# Patient Record
Sex: MALE | Race: WHITE | NOT HISPANIC OR LATINO | Employment: FULL TIME | URBAN - METROPOLITAN AREA
[De-identification: names, ages, dates, MRNs, and addresses within clinical notes are randomized per-mention and may not be internally consistent; named-entity substitution may affect disease eponyms.]

---

## 2018-05-25 LAB
ALBUMIN SERPL BCP-MCNC: 4.3 G/DL (ref 3.5–5.7)
ALP SERPL-CCNC: 71 IU/L (ref 40–150)
ALT SERPL W P-5'-P-CCNC: 41 IU/L (ref 0–50)
ANION GAP SERPL CALCULATED.3IONS-SCNC: 11.5 MM/L
AST SERPL W P-5'-P-CCNC: 25 U/L (ref 8–27)
BASOPHILS # BLD AUTO: 0 X3/UL (ref 0–0.3)
BASOPHILS # BLD AUTO: 0.6 % (ref 0–2)
BILIRUB SERPL-MCNC: 0.5 MG/DL (ref 0.3–1)
BUN SERPL-MCNC: 17 MG/DL (ref 7–25)
CALCIUM SERPL-MCNC: 9.6 MG/DL (ref 8.6–10.5)
CHLORIDE SERPL-SCNC: 102 MM/L (ref 98–107)
CHOLEST SERPL-MCNC: 184 MG/DL (ref 0–200)
CO2 SERPL-SCNC: 30 MM/L (ref 21–31)
CREAT SERPL-MCNC: 1.23 MG/DL (ref 0.7–1.3)
DEPRECATED RDW RBC AUTO: 13.3 %
EGFR (HISTORICAL): > 60 GFR
EGFR AFRICAN AMERICAN (HISTORICAL): > 60 GFR
EOSINOPHIL # BLD AUTO: 0.1 X3/UL (ref 0–0.5)
EOSINOPHIL NFR BLD AUTO: 1.5 % (ref 0–5)
GLUCOSE (HISTORICAL): 114 MG/DL (ref 65–99)
HCT VFR BLD AUTO: 46.4 % (ref 42–52)
HDLC SERPL-MCNC: 31 MG/DL (ref 40–60)
HGB BLD-MCNC: 16.2 G/DL (ref 14–18)
LDLC SERPL CALC-MCNC: 133 MG/DL (ref 75–193)
LYMPHOCYTES # BLD AUTO: 1.5 X3/UL (ref 1.2–4.2)
LYMPHOCYTES NFR BLD AUTO: 19.1 % (ref 20.5–51.1)
MCH RBC QN AUTO: 31.3 PG (ref 26–34)
MCHC RBC AUTO-ENTMCNC: 35 G/DL (ref 31–37)
MCV RBC AUTO: 89.5 FL (ref 81–99)
MONOCYTES # BLD AUTO: 0.6 X3/UL (ref 0–1)
MONOCYTES NFR BLD AUTO: 7.2 % (ref 1.7–12)
NEUTROPHILS # BLD AUTO: 5.6 X3/UL (ref 1.4–6.5)
NEUTS SEG NFR BLD AUTO: 71.6 % (ref 42.2–75.2)
OSMOLALITY, SERUM (HISTORICAL): 280 MOSM (ref 262–291)
PLATELET # BLD AUTO: 177 X3/UL (ref 130–400)
PMV BLD AUTO: 8.8 FL
POTASSIUM SERPL-SCNC: 4.5 MM/L (ref 3.5–5.5)
PSA (HISTORICAL): 0.55 NG/ML (ref 0–4)
RBC # BLD AUTO: 5.19 X6/UL (ref 4.3–5.9)
SODIUM SERPL-SCNC: 139 MM/L (ref 134–143)
TOTAL PROTEIN (HISTORICAL): 7 G/DL (ref 6.4–8.9)
TRIGL SERPL-MCNC: 99 MG/DL (ref 44–166)
VLDL CHOLESTEROL (HISTORICAL): 20 MG/DL (ref 5–51)
WBC # BLD AUTO: 7.8 X3/UL (ref 4.8–10.8)

## 2018-09-22 ENCOUNTER — APPOINTMENT (OUTPATIENT)
Dept: LAB | Facility: HOSPITAL | Age: 58
End: 2018-09-22
Attending: FAMILY MEDICINE
Payer: COMMERCIAL

## 2018-09-22 ENCOUNTER — TRANSCRIBE ORDERS (OUTPATIENT)
Dept: ADMINISTRATIVE | Facility: HOSPITAL | Age: 58
End: 2018-09-22

## 2018-09-22 DIAGNOSIS — K59.00 CONSTIPATION, UNSPECIFIED CONSTIPATION TYPE: ICD-10-CM

## 2018-09-22 DIAGNOSIS — I10 ESSENTIAL HYPERTENSION, MALIGNANT: ICD-10-CM

## 2018-09-22 DIAGNOSIS — R73.03 DIABETES MELLITUS, LATENT: ICD-10-CM

## 2018-09-22 DIAGNOSIS — E66.9 OBESITY, UNSPECIFIED CLASSIFICATION, UNSPECIFIED OBESITY TYPE, UNSPECIFIED WHETHER SERIOUS COMORBIDITY PRESENT: ICD-10-CM

## 2018-09-22 DIAGNOSIS — K59.00 CONSTIPATION, UNSPECIFIED CONSTIPATION TYPE: Primary | ICD-10-CM

## 2018-09-22 DIAGNOSIS — E78.5 HYPERLIPIDEMIA, UNSPECIFIED HYPERLIPIDEMIA TYPE: ICD-10-CM

## 2018-09-22 LAB
ALBUMIN SERPL BCP-MCNC: 4.2 G/DL (ref 3.5–5.7)
ALP SERPL-CCNC: 62 U/L (ref 40–150)
ALT SERPL W P-5'-P-CCNC: 33 U/L (ref 7–52)
ANION GAP SERPL CALCULATED.3IONS-SCNC: 9 MMOL/L (ref 4–13)
AST SERPL W P-5'-P-CCNC: 20 U/L (ref 13–39)
BILIRUB SERPL-MCNC: 0.6 MG/DL (ref 0.2–1)
BUN SERPL-MCNC: 21 MG/DL (ref 7–25)
CALCIUM SERPL-MCNC: 9.5 MG/DL (ref 8.6–10.5)
CHLORIDE SERPL-SCNC: 101 MMOL/L (ref 98–107)
CHOLEST SERPL-MCNC: 189 MG/DL (ref 0–200)
CO2 SERPL-SCNC: 29 MMOL/L (ref 21–31)
CREAT SERPL-MCNC: 1.2 MG/DL (ref 0.7–1.3)
EST. AVERAGE GLUCOSE BLD GHB EST-MCNC: 134 MG/DL
GFR SERPL CREATININE-BSD FRML MDRD: 67 ML/MIN/1.73SQ M
GLUCOSE P FAST SERPL-MCNC: 117 MG/DL (ref 65–99)
HBA1C MFR BLD: 6.3 % (ref 4.2–6.3)
HDLC SERPL-MCNC: 31 MG/DL (ref 40–60)
LDLC SERPL CALC-MCNC: 140 MG/DL (ref 0–100)
NONHDLC SERPL-MCNC: 158 MG/DL
POTASSIUM SERPL-SCNC: 4.6 MMOL/L (ref 3.5–5.5)
PROT SERPL-MCNC: 6.8 G/DL (ref 6.4–8.9)
SODIUM SERPL-SCNC: 139 MMOL/L (ref 134–143)
TRIGL SERPL-MCNC: 92 MG/DL (ref 44–166)

## 2018-09-22 PROCEDURE — 80053 COMPREHEN METABOLIC PANEL: CPT

## 2018-09-22 PROCEDURE — 83036 HEMOGLOBIN GLYCOSYLATED A1C: CPT

## 2018-09-22 PROCEDURE — 36415 COLL VENOUS BLD VENIPUNCTURE: CPT

## 2018-09-22 PROCEDURE — 80061 LIPID PANEL: CPT

## 2018-11-29 DIAGNOSIS — I10 ESSENTIAL HYPERTENSION: Primary | ICD-10-CM

## 2018-11-29 RX ORDER — LOSARTAN POTASSIUM 100 MG/1
100 TABLET ORAL DAILY
Qty: 30 TABLET | Refills: 3 | Status: SHIPPED | OUTPATIENT
Start: 2018-11-29 | End: 2021-03-04 | Stop reason: ALTCHOICE

## 2020-01-02 ENCOUNTER — OFFICE VISIT (OUTPATIENT)
Dept: URGENT CARE | Facility: HOSPITAL | Age: 60
End: 2020-01-02
Payer: COMMERCIAL

## 2020-01-02 VITALS
DIASTOLIC BLOOD PRESSURE: 91 MMHG | HEIGHT: 70 IN | WEIGHT: 287 LBS | SYSTOLIC BLOOD PRESSURE: 160 MMHG | HEART RATE: 74 BPM | RESPIRATION RATE: 18 BRPM | TEMPERATURE: 97.2 F | OXYGEN SATURATION: 98 % | BODY MASS INDEX: 41.09 KG/M2

## 2020-01-02 DIAGNOSIS — R22.1 THROAT SWELLING: Primary | ICD-10-CM

## 2020-01-02 DIAGNOSIS — J02.9 SORE THROAT: ICD-10-CM

## 2020-01-02 LAB — S PYO AG THROAT QL: NEGATIVE

## 2020-01-02 PROCEDURE — 87147 CULTURE TYPE IMMUNOLOGIC: CPT | Performed by: NURSE PRACTITIONER

## 2020-01-02 PROCEDURE — 96372 THER/PROPH/DIAG INJ SC/IM: CPT | Performed by: NURSE PRACTITIONER

## 2020-01-02 PROCEDURE — 99213 OFFICE O/P EST LOW 20 MIN: CPT | Performed by: NURSE PRACTITIONER

## 2020-01-02 PROCEDURE — 87070 CULTURE OTHR SPECIMN AEROBIC: CPT | Performed by: NURSE PRACTITIONER

## 2020-01-02 RX ORDER — EPINEPHRINE 0.3 MG/.3ML
0.3 INJECTION SUBCUTANEOUS ONCE
Qty: 0.6 ML | Refills: 0 | Status: SHIPPED | OUTPATIENT
Start: 2020-01-02 | End: 2021-03-04 | Stop reason: ALTCHOICE

## 2020-01-02 RX ORDER — METHYLPREDNISOLONE 4 MG/1
TABLET ORAL
Qty: 21 TABLET | Refills: 0 | Status: SHIPPED | OUTPATIENT
Start: 2020-01-02 | End: 2021-04-09 | Stop reason: ALTCHOICE

## 2020-01-02 RX ORDER — METHYLPREDNISOLONE SODIUM SUCCINATE 125 MG/2ML
125 INJECTION, POWDER, LYOPHILIZED, FOR SOLUTION INTRAMUSCULAR; INTRAVENOUS ONCE
Status: COMPLETED | OUTPATIENT
Start: 2020-01-02 | End: 2020-01-02

## 2020-01-02 RX ORDER — EPINEPHRINE 0.3 MG/.3ML
0.3 INJECTION SUBCUTANEOUS ONCE
Qty: 0.6 ML | Refills: 0 | Status: SHIPPED | OUTPATIENT
Start: 2020-01-02 | End: 2020-01-02 | Stop reason: SDUPTHER

## 2020-01-02 RX ADMIN — METHYLPREDNISOLONE SODIUM SUCCINATE 125 MG: 125 INJECTION, POWDER, LYOPHILIZED, FOR SOLUTION INTRAMUSCULAR; INTRAVENOUS at 16:50

## 2020-01-02 NOTE — PATIENT INSTRUCTIONS
Patient had improvement after Solu-Medrol injection  He did have an episode where he had vasovagal after injection  He is feeling better at this time  Vital signs are stable  Medrol dose pack taper sent to pharmacy  Tylenol Motrin as needed for pain  Follow up with ENT tomorrow as you already have appointment scheduled  As we discussed you need to go to the ER if he develops any worsening symptoms, difficulty swallowing, wheezing, persistent drooling, shortness of breath or chest pain

## 2020-01-03 NOTE — PROGRESS NOTES
[facility]        NAME: Lyssa José is a 64 y o  male  : 1960    MRN: [id number]  DATE: 2020  TIME: 7:18 PM    Assessment and Plan   Throat swelling [R22 1]  1  Throat swelling  methylPREDNISolone sodium succinate (Solu-MEDROL) injection 125 mg    methylprednisolone (MEDROL) 4 mg tablet    EPINEPHrine (EPIPEN) 0 3 mg/0 3 mL SOAJ    DISCONTINUED: EPINEPHrine (EPIPEN) 0 3 mg/0 3 mL SOAJ   2  Sore throat  POCT rapid strepA    methylprednisolone (MEDROL) 4 mg tablet    Throat culture     Patient is having a sensation of swelling of his throat  He has had this in the past   He has been treated with steroids with good improvement in the past   In in clinic he was given 125 mg of Solu-Medrol  He was monitored for approximately 30-45  minutes  He did have a vasal vagal episode after injection  His blood pressure dropped to 112 over 60  Heart rate was 56  Pulse ox was 97% at that time  After several minutes of lying flat he improved  Blood pressure prior to leaving was 161/90  He states he was feeling better  Patient states his throat felt better after Solu-Medrol  Did give patient an EpiPen so that he would have this and strict instructions to go to the ER if symptoms worsen  Discussed with patient he should only use EpiPen if symptoms became significant that he felt as though his throat was going to close  He verbalized understanding of these directions  He is going to follow up with ENT tomorrow  He states he will go to the ER if swelling gets worse, has difficulty swallowing, or any worsening symptoms  Patient Instructions     Patient Instructions   Patient had improvement after Solu-Medrol injection  He did have an episode where he had vasovagal after injection  He is feeling better at this time  Vital signs are stable  Medrol dose pack taper sent to pharmacy  Tylenol Motrin as needed for pain  Follow up with ENT tomorrow as you already have appointment scheduled  As we discussed you need to go to the ER if he develops any worsening symptoms, difficulty swallowing, wheezing, persistent drooling, shortness of breath or chest pain  Chief Complaint     Chief Complaint   Patient presents with    Sore Throat     started a couple days also states is swollen     Earache     Right just started on way here         History of Present Illness   Jerry Landeros presents to the clinic c/o    This is a 19-year-old male here today with complaints sensation of his throat feels swollen  He does note that he has had cough, congestion and some ear pressure for the last several days  He is not having any fevers at this time  He states he has had about 10-12 episodes in the past for he has had the sensation of his throat swelling  He states he has been seen in the ER in the past   He states at that time they give him an injection and give him a taper  He states he thinks this is steroids  He states he has never had to have epi in the past   He states when this usually occurs at home he takes Benadryl and symptoms resolved  He states last time this happened was in September  He was in floor at that time and took 2 Benadryl  He states he actually went to the ER but did not go in to be seen and symptoms had resolved  He states prior to that he was seen in the ER body urine half ago  He states he has also been seen by ENT  He has had no specific cause of the symptoms  He states he thinks he may be allergic to high fructose corn syrup  He attempts to avoid this  He states at times he does eat it causes no symptoms  He denies no other new medications, lotions or soaps or foods  He states he is able to swallow  He is unable to use foods  He is able to drink fluids  He denies any chest pain or shortness of breath  Review of Systems   Review of Systems   Constitutional: Negative for activity change, chills, fatigue and unexpected weight change     HENT: Positive for congestion, ear pain and sore throat  Negative for sinus pressure and sinus pain  Respiratory: Negative  Cardiovascular: Negative  Skin: Negative  Neurological: Negative  Psychiatric/Behavioral: Negative  Current Medications     Long-Term Medications   Medication Sig Dispense Refill    losartan (COZAAR) 100 MG tablet Take 1 tablet (100 mg total) by mouth daily 30 tablet 3    EPINEPHrine (EPIPEN) 0 3 mg/0 3 mL SOAJ Inject 0 3 mL (0 3 mg total) into a muscle once for 1 dose 0 6 mL 0    [DISCONTINUED] EPINEPHrine (EPIPEN) 0 3 mg/0 3 mL SOAJ Inject 0 3 mL (0 3 mg total) into a muscle once for 1 dose 0 6 mL 0       Current Allergies     Allergies as of 01/02/2020 - Reviewed 01/02/2020   Allergen Reaction Noted    Biaxin [clarithromycin] Hives 01/02/2020    Ceclor [cefaclor] Hives 01/02/2020            The following portions of the patient's history were reviewed and updated as appropriate: allergies, current medications, past family history, past medical history, past social history, past surgical history and problem list     Objective   /91   Pulse 74   Temp (!) 97 2 °F (36 2 °C) (Tympanic)   Resp 18   Ht 5' 10" (1 778 m)   Wt 130 kg (287 lb)   SpO2 98%   BMI 41 18 kg/m²        Physical Exam     Physical Exam   Constitutional: He is oriented to person, place, and time  He appears well-developed and well-nourished  He does not appear ill  No distress  HENT:   Mouth/Throat: Uvula is midline and oropharynx is clear and moist  Tonsils are 1+ on the right  Tonsils are 1+ on the left  Mild erythema of the posterior pharynx tonsils are 1/4 with no exudate  There is moderate swelling of the of the uvula  No wheezing or trismus  No stridor noted  Airway is patent on exam     Neck: Normal range of motion  Neck supple  Cardiovascular: Normal rate, regular rhythm and normal heart sounds  Pulmonary/Chest: Effort normal and breath sounds normal  No stridor   No respiratory distress  He has no wheezes  He has no rhonchi  He has no rales  Neurological: He is alert and oriented to person, place, and time  Psychiatric: He has a normal mood and affect  His behavior is normal    Nursing note and vitals reviewed

## 2020-01-07 LAB — BACTERIA THROAT CULT: ABNORMAL

## 2020-01-08 ENCOUNTER — TELEPHONE (OUTPATIENT)
Dept: URGENT CARE | Facility: HOSPITAL | Age: 60
End: 2020-01-08

## 2020-01-08 DIAGNOSIS — J02.0 STREP PHARYNGITIS: Primary | ICD-10-CM

## 2020-01-08 RX ORDER — CLINDAMYCIN HYDROCHLORIDE 300 MG/1
300 CAPSULE ORAL 3 TIMES DAILY
Qty: 21 CAPSULE | Refills: 0 | Status: SHIPPED | OUTPATIENT
Start: 2020-01-08 | End: 2020-01-15

## 2020-01-08 NOTE — TELEPHONE ENCOUNTER
Call patient to discuss positive throat culture  Will start antibiotic  Patient that he is feeling better but still has slight cough  Sore throat has improved    He just finished steroids

## 2021-03-04 ENCOUNTER — TELEPHONE (OUTPATIENT)
Dept: FAMILY MEDICINE CLINIC | Facility: CLINIC | Age: 61
End: 2021-03-04

## 2021-03-04 ENCOUNTER — OFFICE VISIT (OUTPATIENT)
Dept: FAMILY MEDICINE CLINIC | Facility: CLINIC | Age: 61
End: 2021-03-04
Payer: COMMERCIAL

## 2021-03-04 VITALS
HEIGHT: 70 IN | BODY MASS INDEX: 37.65 KG/M2 | SYSTOLIC BLOOD PRESSURE: 164 MMHG | WEIGHT: 263 LBS | TEMPERATURE: 99 F | OXYGEN SATURATION: 96 % | DIASTOLIC BLOOD PRESSURE: 92 MMHG | HEART RATE: 77 BPM

## 2021-03-04 DIAGNOSIS — I10 ESSENTIAL HYPERTENSION: Primary | ICD-10-CM

## 2021-03-04 DIAGNOSIS — E11.65 UNCONTROLLED TYPE 2 DIABETES MELLITUS WITH HYPERGLYCEMIA (HCC): ICD-10-CM

## 2021-03-04 DIAGNOSIS — R73.9 HYPERGLYCEMIA: ICD-10-CM

## 2021-03-04 DIAGNOSIS — B35.6 TINEA CRURIS: ICD-10-CM

## 2021-03-04 LAB — SL AMB POCT HEMOGLOBIN AIC: 13.1 (ref ?–6.5)

## 2021-03-04 PROCEDURE — 83036 HEMOGLOBIN GLYCOSYLATED A1C: CPT | Performed by: FAMILY MEDICINE

## 2021-03-04 PROCEDURE — 1036F TOBACCO NON-USER: CPT | Performed by: FAMILY MEDICINE

## 2021-03-04 PROCEDURE — 3008F BODY MASS INDEX DOCD: CPT | Performed by: FAMILY MEDICINE

## 2021-03-04 PROCEDURE — 4010F ACE/ARB THERAPY RXD/TAKEN: CPT | Performed by: FAMILY MEDICINE

## 2021-03-04 PROCEDURE — 3046F HEMOGLOBIN A1C LEVEL >9.0%: CPT | Performed by: FAMILY MEDICINE

## 2021-03-04 PROCEDURE — 3725F SCREEN DEPRESSION PERFORMED: CPT | Performed by: FAMILY MEDICINE

## 2021-03-04 PROCEDURE — 99204 OFFICE O/P NEW MOD 45 MIN: CPT | Performed by: FAMILY MEDICINE

## 2021-03-04 RX ORDER — METFORMIN HYDROCHLORIDE 500 MG/1
500 TABLET, EXTENDED RELEASE ORAL
Qty: 60 TABLET | Refills: 5 | Status: SHIPPED | OUTPATIENT
Start: 2021-03-04 | End: 2022-02-14

## 2021-03-04 RX ORDER — CLOTRIMAZOLE AND BETAMETHASONE DIPROPIONATE 10; .64 MG/G; MG/G
CREAM TOPICAL 2 TIMES DAILY
Qty: 45 G | Refills: 1 | Status: SHIPPED | OUTPATIENT
Start: 2021-03-04 | End: 2021-11-15 | Stop reason: ALTCHOICE

## 2021-03-04 RX ORDER — LOSARTAN POTASSIUM 50 MG/1
50 TABLET ORAL DAILY
Qty: 30 TABLET | Refills: 5 | Status: SHIPPED | OUTPATIENT
Start: 2021-03-04 | End: 2021-08-30

## 2021-03-04 NOTE — ASSESSMENT & PLAN NOTE
Hypertension I recommend resuming antihypertensive therapy work on weight reduction and avoidance of sodium in the diet and follow-up with me after laboratory work in about 3 months

## 2021-03-04 NOTE — ASSESSMENT & PLAN NOTE
Lab Results   Component Value Date    HGBA1C 13 1 (A) 03/04/2021   Patient came in today to reestablish care here at the office he has been off all medications he noted change in the feeling of the palms of the hands and the soles of his feet at times itching feeling in the hands without a rash and also loss of feeling or sensation of stepping on something under his feet when he walks typical of a diabetic neuropathy A1c done here at the office today to check this as he has not been to a doctor for the last year and is showed 13 1 level and he will start on medication obtain laboratory work and follow-up with me closely

## 2021-03-04 NOTE — PATIENT INSTRUCTIONS
Meal Planning with Diabetes Exchanges   AMBULATORY CARE:   Diabetes exchanges  are servings of food that contain similar amounts of carbohydrate, fat, protein, and calories within a food group  The exchanges can be used to develop a healthy meal plan that helps to keep your blood sugar within the recommended levels  A meal plan with the right amount of carbohydrates is especially important  Your blood sugar naturally rises after you eat carbohydrates  Too many carbohydrates in 1 meal or snack can raise your blood sugar level  Carbohydrates are found in starches, fruit, milk, yogurt, and sweets  Call your doctor if:   · You have high blood sugar levels during a certain time of day, or almost all of the time  · You often have low blood sugar levels  · You have questions or concerns about your condition or care  Create a meal plan with exchanges:  A dietitian will work with you to develop a healthy meal plan that is right for you  This meal plan will include the amount of exchanges you can have from each food group throughout the day  Follow your meal plan by keeping track of the amount of exchanges you eat for each meal and snack  Your meal plan will be based on your age, weight, blood sugar levels, medicine, and activity level  Starch food group exchanges:  Each exchange below contains about 15 grams of carbohydrate , 3 grams of protein, 1 gram of fat, and 80 calories  · 1 ounce of white, whole wheat or rye bread (1 slice)    · 1 ounce of bagel (about ¼ of a bagel)    · 1 6-inch flour or corn tortilla or 1 4-inch pancake (about ¼ inch thick)    · ?  cup of cooked pasta or rice    · ¾ cup of dry, ready-to-eat cereal with no sugar added     · ½ cup of cooked cereal, such as oatmeal    · 3 tiffany cracker squares or 8 animal crackers    · 6 saltine-type crackers or     · 3 cups of popcorn or ¾ ounce of pretzels     · Starchy vegetables and cooked legumes:      ? ½ cup of corn, green peas, sweet potatoes, or mashed potatoes     ? ¼ of a large baked potato     ? 1 cup of acorn, butternut squash, or pumpkin     ? ½ cup of beans, lentils, or peas (such as gaytan, kidney, or black-eyed)    ? ? cup of lima beans    Fruit group exchanges:  Each exchange contains about 15 grams of carbohydrate  and 60 calories  · 1 small (4 ounce) apple, banana orange, or nectarine    · ½ cup of canned or fresh fruit    · ½ cup (4 ounces) of unsweetened fruit juice    · 2 tablespoons of dried fruit    Milk group exchanges:  Each exchange contains about 12 grams of carbohydrate  and 8 grams of protein  The amount of fat and calories in each serving depends on the type of milk (such as whole, low-fat, or fat-free)  · 1 cup fat-free or low-fat milk    · ¾ cup of plain, nonfat yogurt    · 1 cup fat-free, flavored yogurt with artificial (no calorie) sweetener    Non-starchy vegetable group exchanges:  Each exchange contains about 5 grams of carbohydrate , 2 grams of protein, and 25 calories  Examples include beets, broccoli, cabbage, carrots, cauliflower, cucumber, mushrooms, tomatoes, and zucchini  · ½ cup of cooked vegetables or 1 cup of raw vegetables     · ½ cup of vegetable juice    Meat and meat substitute group exchanges:  Each exchange of a lean meat  listed below contains about 7 grams of protein, 0 to 3 grams of fat, and 45 calories  The meat and meat substitutes food group does not contain any carbohydrates  Medium and high-fat meats have more calories  · 1 ounce of chicken or turkey without skin, or 1 ounce of fish (not breaded or fried)     · 1 ounce of lean beef, pork, or lamb     · 1-inch cube or 1 ounce of low-fat cheese     · 2 egg whites or ¼ cup of egg substitute     · ½ cup of tofu    Sweets, desserts, and other carbohydrate group exchanges:   · Sweets and other desserts:  Each exchange has about 15 grams of carbohydrate   ? 1 ounce of román food cake or 2-inch square cake (unfrosted)    ? 2 small cookies     ?  ½ cup of sugar-free, fat-free ice cream    ? 1 tablespoon of syrup, jam, jelly, table sugar, or honey    · Combination foods:     ? 1 cup of an entrée, such as lasagna, spaghetti with meatballs, macaroni and cheese, and chili with beans (each serving counts as 2 carbohydrate exchanges )     ? 1 cup of tomato or vegetable beef soup (each serving counts as 1 carbohydrate exchange )    Fat group exchanges:  Each exchange contains 5 grams of fat and 45 calories  · 1 teaspoon of oil (such as canola, olive, or corn oil)     · 6 almonds or cashews, 10 peanuts, or 4 pecan halves     · 2 tablespoons of avocado     · ½ tablespoon of peanut butter     · 1 teaspoon of regular margarine or 2 teaspoons of low-fat margarine     · 1 teaspoon of regular butter or 1 tablespoon of low-fat butter     · 1 teaspoon of regular mayonnaise or 1 tablespoon of low-fat mayonnaise     · 1 tablespoon of regular salad dressing or 2 tablespoons of low-fat salad dressing    Free foods: The foods on this list are called free foods because they have very few calories  Free foods usually do not increase your blood sugar if you limit them  · 1 tablespoon of catsup or taco sauce     · ¼ cup of salsa     · 2 tablespoons of sugar-free syrup or 2 teaspoons of light jam or jelly     · 1 tablespoon of fat-free salad dressing     · 4 tablespoons of fat-free margarine or fat-free mayonnaise     · Sugar-free drinks: diet soda, sugar-free drink mixes, or mineral water     · Low-sodium bouillon or fat-free broth     · Mustard     · Seasonings such as spices, herbs, and garlic     · Sugar-free gelatin without added fruit    Other healthy nutrition guidelines:   · Limit drinks with sugar substitutes  Your dietitian or healthcare provider will encourage you to drink water  Water helps your kidneys to function properly  Ask how much water you should drink every day  · Eat more fiber    Choose foods that are good sources of fiber, such as fruits, vegetables, and whole grains  Cereals that contain 5 or more grams of fiber per serving are good sources of fiber  Legumes such as garbanzo, gaytan beans, kidney beans, and lentils are also good sources  · Limit fat  Ask your dietitian or healthcare provider how much fat you should eat each day  Choose foods low in fat, saturated fat, trans fat, and cholesterol  Examples include turkey or chicken without the skin, fish, lean cuts of meat, and beans  Low-fat dairy foods, such as low-fat or fat-free milk and low-fat yogurt are also good choices  Omega-3 fatty acids are healthy fats that are found in canola oil, soybean oil and fatty fish  [place], albacore tuna, and sardines are good sources of omega 3 fatty acids  Eat 2 servings of these types of fish each week  Do not eat fried fish  · Limit sugar  Sugar and sweets must be counted toward the carbohydrate exchanges that you can have within your meal plan  Limit sugar and sweets because they are usually also high in calories and fat  Eat smaller portions of sweets by sharing a dessert or asking for a child-size portion at a restaurant  · Limit sodium  (salt) to about 2,300 mg per day  You may need to eat even less sodium if you have certain medical conditions  Foods high in sodium include soy sauce, potato chips, and soup  · Limit alcohol  Ask your healthcare provider if it is safe for you to drink alcohol  If alcohol is safe for you to have, eat a meal when you drink alcohol  If you drink alcohol on an empty stomach, your blood sugar may drop to a low level  Women 21 years or older and men 72 years or older should limit alcohol to 1 drink a day  Men aged 24 to 59 years should limit alcohol to 2 drinks a day  A drink of alcohol is 5 ounces of wine, 12 ounces of beer, or 1½ ounces of liquor  Other ways to manage your diabetes:   · Control your blood sugar level  Test your blood sugar level regularly and keep a record of the results   Ask your healthcare provider when and how often to test your blood sugar  You may need to check your blood sugar level at least 3 times each day  · Talk to your healthcare provider about your weight  Ask if you need to lose weight, and how much you need to lose  If you are overweight, you may need to make other changes to lose weight  Ask your healthcare provider to help you create a weight loss program      · Get regular physical activity  Physical activity can help decrease your blood sugar level  It can also help to decrease your risk for heart disease and help you lose weight  Adults should have moderate intensity physical activity for at least 150 minutes every week  Spread the amount of activity over at least 3 days a week  Do not skip more than 2 days in a row  Children should get at least 60 minutes of moderate physical activity on most days of the week  Examples of moderate physical activity include brisk walking, running, and swimming  Do not sit for longer than 30 minutes  Work with your healthcare provider to create a plan for physical activity  © Copyright [address] Information is for End User's use only and may not be sold, redistributed or otherwise used for commercial purposes  All illustrations and images included in CareNotes® are the copyrighted property of [employer]  or [address] [place] [address]  The above information is an  only  It is not intended as medical advice for individual conditions or treatments  Talk to your doctor, nurse or pharmacist before following any medical regimen to see if it is safe and effective for you

## 2021-03-04 NOTE — ASSESSMENT & PLAN NOTE
Hyperglycemia will re-evaluate patient will watch diet avoiding carbohydrates work on weight reduction

## 2021-03-04 NOTE — PROGRESS NOTES
BMI Counseling: Body mass index is 37 74 kg/m²  The BMI is above normal  Nutrition recommendations include decreasing overall calorie intake, 3-5 servings of fruits/vegetables daily, reducing fast food intake and consuming healthier snacks  Exercise recommendations include exercising 3-5 times per week and joining a gym

## 2021-03-04 NOTE — PROGRESS NOTES
Assessment/Plan:       Problem List Items Addressed This Visit        Endocrine    Uncontrolled type 2 diabetes mellitus with hyperglycemia ([facility] [address] )       Lab Results   Component Value Date    HGBA1C 13 1 (A) 03/04/2021   Patient came in today to reestablish care here at the office he has been off all medications he noted change in the feeling of the palms of the hands and the soles of his feet at times itching feeling in the hands without a rash and also loss of feeling or sensation of stepping on something under his feet when he walks typical of a diabetic neuropathy A1c done here at the office today to check this as he has not been to a doctor for the last year and is showed 13 1 level and he will start on medication obtain laboratory work and follow-up with me closely            Cardiovascular and Mediastinum    Essential hypertension - Primary     Hypertension I recommend resuming antihypertensive therapy work on weight reduction and avoidance of sodium in the diet and follow-up with me after laboratory work in about 3 months            Musculoskeletal and Integument    Tinea cruris     Pruritic rash over groin upper legs erythematous  Patient missed the hands or itching as well         Relevant Medications    clotrimazole-betamethasone (LOTRISONE) 1-0 05 % cream       Other    Hyperglycemia     Hyperglycemia will re-evaluate patient will watch diet avoiding carbohydrates work on weight reduction         Relevant Orders    POCT hemoglobin A1c (Completed)            Subjective:      Patient ID: Rosi Randall is a 63 y o  male  Patient presents today to reestablish care here at the office not seen in over 2 years he is concerned about a rash that developed on his hands and on his waist and also would like his feet checked for swelling and additional rash area he has not been seen at a physician's office in over 1 year  He has had a history of hypertension and stop taking all his medication now    He was very sick in January last year-concerned about possibly Covid-not tested  The following portions of the patient's history were reviewed and updated as appropriate: allergies, current medications, past family history, past medical history, past social history, past surgical history and problem list     Review of Systems   Constitutional: Negative for chills, fatigue and fever  HENT: Negative for congestion, nosebleeds, rhinorrhea, sinus pressure and sore throat  Eyes: Negative for discharge and redness  Respiratory: Negative for cough and shortness of breath  Cardiovascular: Positive for leg swelling  Negative for chest pain and palpitations  Gastrointestinal: Negative for abdominal pain, blood in stool and nausea  Endocrine: Negative for cold intolerance, heat intolerance and polyuria  Genitourinary: Negative for dysuria and frequency  Musculoskeletal: Negative for arthralgias, back pain and myalgias  Skin: Positive for rash  Neurological: Negative for dizziness, weakness and headaches  Hematological: Negative for adenopathy  Psychiatric/Behavioral: Negative for behavioral problems and sleep disturbance  The patient is not nervous/anxious  Objective:      /92   Pulse 77   Temp 99 °F (37 2 °C)   Ht 5' 10" (1 778 m)   Wt 119 kg (263 lb)   SpO2 96%   BMI 37 74 kg/m²        Physical Exam  Vitals signs and nursing note reviewed  Constitutional:       Appearance: Normal appearance  He is well-developed  He is obese  HENT:      Head: Normocephalic and atraumatic  Right Ear: Tympanic membrane and external ear normal       Left Ear: Tympanic membrane and external ear normal       Nose: Nose normal       Mouth/Throat:      Mouth: Mucous membranes are moist       Pharynx: Oropharynx is clear  Eyes:      General: No scleral icterus  Conjunctiva/sclera: Conjunctivae normal       Pupils: Pupils are equal, round, and reactive to light     Neck:      Musculoskeletal: Normal range of motion and neck supple  Thyroid: No thyromegaly  Vascular: No JVD  Cardiovascular:      Rate and Rhythm: Normal rate and regular rhythm  Heart sounds: Normal heart sounds  No murmur  Pulmonary:      Effort: Pulmonary effort is normal       Breath sounds: Normal breath sounds  No wheezing or rales  Chest:      Chest wall: No tenderness  Abdominal:      General: Bowel sounds are normal  There is no distension  Palpations: Abdomen is soft  There is no mass  Tenderness: There is no abdominal tenderness  There is no guarding or rebound  Musculoskeletal: Normal range of motion  General: No tenderness or deformity  Lymphadenopathy:      Cervical: No cervical adenopathy  Skin:     General: Skin is warm and dry  Findings: No erythema or rash  Neurological:      General: No focal deficit present  Mental Status: He is alert and oriented to person, place, and time  Cranial Nerves: No cranial nerve deficit  Deep Tendon Reflexes: Reflexes are normal and symmetric  Reflexes normal    Psychiatric:         Mood and Affect: Mood normal          Behavior: Behavior normal          Thought Content:  Thought content normal          Judgment: Judgment normal           Data:    Laboratory Results:   Radiology/Other Diagnostic Testing Results:      Lab Results   Component Value Date    WBC 7 8 05/25/2018    HGB 16 2 05/25/2018    HCT 46 4 05/25/2018    MCV 89 5 05/25/2018     05/25/2018     Lab Results   Component Value Date     05/25/2018    K 4 6 09/22/2018     09/22/2018    CO2 29 09/22/2018    ANIONGAP 11 5 05/25/2018    BUN 21 09/22/2018    CREATININE 1 20 09/22/2018    GLUF 117 (H) 09/22/2018    CALCIUM 9 5 09/22/2018    AST 20 09/22/2018    ALT 33 09/22/2018    ALKPHOS 62 09/22/2018    PROT 7 0 05/25/2018    BILITOT 0 5 05/25/2018    EGFR 67 09/22/2018     Lab Results   Component Value Date    CHOLESTEROL 189 09/22/2018     Lab Results   Component Value Date    HDL 31 (L) 09/22/2018    HDL 31 (L) 05/25/2018     Lab Results   Component Value Date    LDLCALC 140 (H) 09/22/2018    LDLCALC 133 0 05/25/2018     Lab Results   Component Value Date    TRIG 92 09/22/2018    TRIG 99 05/25/2018     No results found for: CHOLHDL  No results found for: [id number], TSH  Lab Results   Component Value Date    HGBA1C 13 1 (A) 03/04/2021     Lab Results   Component Value Date    PSA 0 55 05/25/2018       Jerald Blackwell DO

## 2021-03-04 NOTE — TELEPHONE ENCOUNTER
Notify patient this medication has been sent into the pharmacy at 50 mg once daily if his blood pressure does not respond at his next visit I will increase the dosage at that time

## 2021-03-07 ENCOUNTER — LAB (OUTPATIENT)
Dept: LAB | Facility: HOSPITAL | Age: 61
End: 2021-03-07
Payer: COMMERCIAL

## 2021-03-07 DIAGNOSIS — E11.65 UNCONTROLLED TYPE 2 DIABETES MELLITUS WITH HYPERGLYCEMIA (HCC): ICD-10-CM

## 2021-03-07 DIAGNOSIS — B35.6 TINEA CRURIS: ICD-10-CM

## 2021-03-07 DIAGNOSIS — R73.9 HYPERGLYCEMIA: ICD-10-CM

## 2021-03-07 DIAGNOSIS — I10 ESSENTIAL HYPERTENSION: ICD-10-CM

## 2021-03-07 LAB
ALBUMIN SERPL BCP-MCNC: 3.5 G/DL (ref 3.5–5)
ALP SERPL-CCNC: 176 U/L (ref 46–116)
ALT SERPL W P-5'-P-CCNC: 288 U/L (ref 12–78)
ANION GAP SERPL CALCULATED.3IONS-SCNC: 9 MMOL/L (ref 4–13)
AST SERPL W P-5'-P-CCNC: 57 U/L (ref 5–45)
BASOPHILS # BLD AUTO: 0.03 THOUSANDS/ΜL (ref 0–0.1)
BASOPHILS NFR BLD AUTO: 1 % (ref 0–1)
BILIRUB SERPL-MCNC: 0.6 MG/DL (ref 0.2–1)
BUN SERPL-MCNC: 20 MG/DL (ref 5–25)
CALCIUM SERPL-MCNC: 9.5 MG/DL (ref 8.3–10.1)
CHLORIDE SERPL-SCNC: 98 MMOL/L (ref 100–108)
CHOLEST SERPL-MCNC: 266 MG/DL (ref 50–200)
CO2 SERPL-SCNC: 26 MMOL/L (ref 21–32)
CREAT SERPL-MCNC: 1.2 MG/DL (ref 0.6–1.3)
EOSINOPHIL # BLD AUTO: 0.11 THOUSAND/ΜL (ref 0–0.61)
EOSINOPHIL NFR BLD AUTO: 2 % (ref 0–6)
ERYTHROCYTE [DISTWIDTH] IN BLOOD BY AUTOMATED COUNT: 12.1 % (ref 11.6–15.1)
GFR SERPL CREATININE-BSD FRML MDRD: 66 ML/MIN/1.73SQ M
GLUCOSE P FAST SERPL-MCNC: 277 MG/DL (ref 65–99)
HCT VFR BLD AUTO: 49.9 % (ref 36.5–49.3)
HDLC SERPL-MCNC: 29 MG/DL
HGB BLD-MCNC: 16.7 G/DL (ref 12–17)
IMM GRANULOCYTES # BLD AUTO: 0.01 THOUSAND/UL (ref 0–0.2)
IMM GRANULOCYTES NFR BLD AUTO: 0 % (ref 0–2)
LDLC SERPL CALC-MCNC: 158 MG/DL (ref 0–100)
LYMPHOCYTES # BLD AUTO: 1.41 THOUSANDS/ΜL (ref 0.6–4.47)
LYMPHOCYTES NFR BLD AUTO: 24 % (ref 14–44)
MCH RBC QN AUTO: 30.2 PG (ref 26.8–34.3)
MCHC RBC AUTO-ENTMCNC: 33.5 G/DL (ref 31.4–37.4)
MCV RBC AUTO: 90 FL (ref 82–98)
MONOCYTES # BLD AUTO: 0.42 THOUSAND/ΜL (ref 0.17–1.22)
MONOCYTES NFR BLD AUTO: 7 % (ref 4–12)
NEUTROPHILS # BLD AUTO: 3.92 THOUSANDS/ΜL (ref 1.85–7.62)
NEUTS SEG NFR BLD AUTO: 66 % (ref 43–75)
NONHDLC SERPL-MCNC: 237 MG/DL
NRBC BLD AUTO-RTO: 0 /100 WBCS
PLATELET # BLD AUTO: 178 THOUSANDS/UL (ref 149–390)
PMV BLD AUTO: 9.9 FL (ref 8.9–12.7)
POTASSIUM SERPL-SCNC: 4.4 MMOL/L (ref 3.5–5.3)
PROT SERPL-MCNC: 7.7 G/DL (ref 6.4–8.2)
RBC # BLD AUTO: 5.53 MILLION/UL (ref 3.88–5.62)
SODIUM SERPL-SCNC: 133 MMOL/L (ref 136–145)
TRIGL SERPL-MCNC: 393 MG/DL
TSH SERPL DL<=0.05 MIU/L-ACNC: 1.48 UIU/ML (ref 0.36–3.74)
WBC # BLD AUTO: 5.9 THOUSAND/UL (ref 4.31–10.16)

## 2021-03-07 PROCEDURE — 84153 ASSAY OF PSA TOTAL: CPT

## 2021-03-07 PROCEDURE — 36415 COLL VENOUS BLD VENIPUNCTURE: CPT

## 2021-03-07 PROCEDURE — 80061 LIPID PANEL: CPT

## 2021-03-07 PROCEDURE — 80053 COMPREHEN METABOLIC PANEL: CPT

## 2021-03-07 PROCEDURE — 84443 ASSAY THYROID STIM HORMONE: CPT

## 2021-03-07 PROCEDURE — 85025 COMPLETE CBC W/AUTO DIFF WBC: CPT

## 2021-03-07 PROCEDURE — 84154 ASSAY OF PSA FREE: CPT

## 2021-03-08 DIAGNOSIS — E11.65 UNCONTROLLED TYPE 2 DIABETES MELLITUS WITH HYPERGLYCEMIA (HCC): Primary | ICD-10-CM

## 2021-03-10 DIAGNOSIS — E11.65 UNCONTROLLED TYPE 2 DIABETES MELLITUS WITH HYPERGLYCEMIA (HCC): Primary | ICD-10-CM

## 2021-03-10 LAB
PSA FREE MFR SERPL: 34 %
PSA FREE SERPL-MCNC: 0.17 NG/ML
PSA SERPL-MCNC: 0.5 NG/ML (ref 0–4)

## 2021-03-10 RX ORDER — BLOOD-GLUCOSE METER
1 EACH MISCELLANEOUS 2 TIMES DAILY
Qty: 1 KIT | Refills: 3 | Status: SHIPPED | OUTPATIENT
Start: 2021-03-10

## 2021-03-10 RX ORDER — BLOOD SUGAR DIAGNOSTIC
STRIP MISCELLANEOUS
Qty: 200 EACH | Refills: 2 | Status: SHIPPED | OUTPATIENT
Start: 2021-03-10 | End: 2021-04-09 | Stop reason: SDUPTHER

## 2021-04-05 ENCOUNTER — APPOINTMENT (OUTPATIENT)
Dept: LAB | Facility: CLINIC | Age: 61
End: 2021-04-05
Payer: COMMERCIAL

## 2021-04-05 DIAGNOSIS — E11.65 UNCONTROLLED TYPE 2 DIABETES MELLITUS WITH HYPERGLYCEMIA (HCC): ICD-10-CM

## 2021-04-05 LAB
ALBUMIN SERPL BCP-MCNC: 4 G/DL (ref 3.5–5)
ALP SERPL-CCNC: 72 U/L (ref 46–116)
ALT SERPL W P-5'-P-CCNC: 46 U/L (ref 12–78)
ANION GAP SERPL CALCULATED.3IONS-SCNC: 1 MMOL/L (ref 4–13)
AST SERPL W P-5'-P-CCNC: 27 U/L (ref 5–45)
BILIRUB SERPL-MCNC: 0.58 MG/DL (ref 0.2–1)
BUN SERPL-MCNC: 24 MG/DL (ref 5–25)
CALCIUM SERPL-MCNC: 9.2 MG/DL (ref 8.3–10.1)
CHLORIDE SERPL-SCNC: 108 MMOL/L (ref 100–108)
CO2 SERPL-SCNC: 29 MMOL/L (ref 21–32)
CREAT SERPL-MCNC: 1.16 MG/DL (ref 0.6–1.3)
GFR SERPL CREATININE-BSD FRML MDRD: 69 ML/MIN/1.73SQ M
GLUCOSE P FAST SERPL-MCNC: 105 MG/DL (ref 65–99)
POTASSIUM SERPL-SCNC: 4.3 MMOL/L (ref 3.5–5.3)
PROT SERPL-MCNC: 7.5 G/DL (ref 6.4–8.2)
SODIUM SERPL-SCNC: 138 MMOL/L (ref 136–145)

## 2021-04-05 PROCEDURE — 80053 COMPREHEN METABOLIC PANEL: CPT

## 2021-04-05 PROCEDURE — 36415 COLL VENOUS BLD VENIPUNCTURE: CPT

## 2021-04-09 ENCOUNTER — OFFICE VISIT (OUTPATIENT)
Dept: FAMILY MEDICINE CLINIC | Facility: CLINIC | Age: 61
End: 2021-04-09
Payer: COMMERCIAL

## 2021-04-09 VITALS
HEART RATE: 73 BPM | WEIGHT: 250 LBS | TEMPERATURE: 98.9 F | DIASTOLIC BLOOD PRESSURE: 84 MMHG | OXYGEN SATURATION: 97 % | BODY MASS INDEX: 35.79 KG/M2 | HEIGHT: 70 IN | SYSTOLIC BLOOD PRESSURE: 138 MMHG

## 2021-04-09 DIAGNOSIS — Z12.11 SCREENING FOR COLORECTAL CANCER: ICD-10-CM

## 2021-04-09 DIAGNOSIS — Z12.11 SCREENING FOR COLON CANCER: ICD-10-CM

## 2021-04-09 DIAGNOSIS — Z11.59 NEED FOR HEPATITIS C SCREENING TEST: ICD-10-CM

## 2021-04-09 DIAGNOSIS — Z12.12 SCREENING FOR COLORECTAL CANCER: ICD-10-CM

## 2021-04-09 DIAGNOSIS — E11.65 UNCONTROLLED TYPE 2 DIABETES MELLITUS WITH HYPERGLYCEMIA (HCC): ICD-10-CM

## 2021-04-09 DIAGNOSIS — I10 ESSENTIAL HYPERTENSION: Primary | ICD-10-CM

## 2021-04-09 DIAGNOSIS — Z11.4 SCREENING FOR HIV (HUMAN IMMUNODEFICIENCY VIRUS): ICD-10-CM

## 2021-04-09 PROCEDURE — 3079F DIAST BP 80-89 MM HG: CPT | Performed by: FAMILY MEDICINE

## 2021-04-09 PROCEDURE — 3075F SYST BP GE 130 - 139MM HG: CPT | Performed by: FAMILY MEDICINE

## 2021-04-09 PROCEDURE — 3008F BODY MASS INDEX DOCD: CPT | Performed by: FAMILY MEDICINE

## 2021-04-09 PROCEDURE — 1036F TOBACCO NON-USER: CPT | Performed by: FAMILY MEDICINE

## 2021-04-09 PROCEDURE — 99214 OFFICE O/P EST MOD 30 MIN: CPT | Performed by: FAMILY MEDICINE

## 2021-04-09 RX ORDER — BLOOD SUGAR DIAGNOSTIC
STRIP MISCELLANEOUS
Qty: 200 EACH | Refills: 2 | Status: SHIPPED | OUTPATIENT
Start: 2021-04-09

## 2021-04-09 RX ORDER — BLOOD SUGAR DIAGNOSTIC
STRIP MISCELLANEOUS
Qty: 200 EACH | Refills: 2 | Status: SHIPPED | OUTPATIENT
Start: 2021-04-09 | End: 2021-04-09 | Stop reason: SDUPTHER

## 2021-04-09 NOTE — ASSESSMENT & PLAN NOTE
Patient presents for follow-up evaluation for uncontrolled diabetes and hypertension he has been trying to monitor his blood glucose while on medication and diet now  Lab Results   Component Value Date    HGBA1C 13 1 (A) 03/04/2021 Multiple sclerosis

## 2021-04-09 NOTE — PATIENT INSTRUCTIONS
Meal Planning with Diabetes Exchanges   AMBULATORY CARE:   Diabetes exchanges  are servings of food that contain similar amounts of carbohydrate, fat, protein, and calories within a food group  The exchanges can be used to develop a healthy meal plan that helps to keep your blood sugar within the recommended levels  A meal plan with the right amount of carbohydrates is especially important  Your blood sugar naturally rises after you eat carbohydrates  Too many carbohydrates in 1 meal or snack can raise your blood sugar level  Carbohydrates are found in starches, fruit, milk, yogurt, and sweets  Call your doctor if:   · You have high blood sugar levels during a certain time of day, or almost all of the time  · You often have low blood sugar levels  · You have questions or concerns about your condition or care  Create a meal plan with exchanges:  A dietitian will work with you to develop a healthy meal plan that is right for you  This meal plan will include the amount of exchanges you can have from each food group throughout the day  Follow your meal plan by keeping track of the amount of exchanges you eat for each meal and snack  Your meal plan will be based on your age, weight, blood sugar levels, medicine, and activity level  Starch food group exchanges:  Each exchange below contains about 15 grams of carbohydrate , 3 grams of protein, 1 gram of fat, and 80 calories  · 1 ounce of white, whole wheat or rye bread (1 slice)    · 1 ounce of bagel (about ¼ of a bagel)    · 1 6-inch flour or corn tortilla or 1 4-inch pancake (about ¼ inch thick)    · ?  cup of cooked pasta or rice    · ¾ cup of dry, ready-to-eat cereal with no sugar added     · ½ cup of cooked cereal, such as oatmeal    · 3 tiffany cracker squares or 8 animal crackers    · 6 saltine-type crackers or     · 3 cups of popcorn or ¾ ounce of pretzels     · Starchy vegetables and cooked legumes:      ? ½ cup of corn, green peas, sweet potatoes, or mashed potatoes     ? ¼ of a large baked potato     ? 1 cup of acorn, butternut squash, or pumpkin     ? ½ cup of beans, lentils, or peas (such as gaytan, kidney, or black-eyed)    ? ? cup of lima beans    Fruit group exchanges:  Each exchange contains about 15 grams of carbohydrate  and 60 calories  · 1 small (4 ounce) apple, banana orange, or nectarine    · ½ cup of canned or fresh fruit    · ½ cup (4 ounces) of unsweetened fruit juice    · 2 tablespoons of dried fruit    Milk group exchanges:  Each exchange contains about 12 grams of carbohydrate  and 8 grams of protein  The amount of fat and calories in each serving depends on the type of milk (such as whole, low-fat, or fat-free)  · 1 cup fat-free or low-fat milk    · ¾ cup of plain, nonfat yogurt    · 1 cup fat-free, flavored yogurt with artificial (no calorie) sweetener    Non-starchy vegetable group exchanges:  Each exchange contains about 5 grams of carbohydrate , 2 grams of protein, and 25 calories  Examples include beets, broccoli, cabbage, carrots, cauliflower, cucumber, mushrooms, tomatoes, and zucchini  · ½ cup of cooked vegetables or 1 cup of raw vegetables     · ½ cup of vegetable juice    Meat and meat substitute group exchanges:  Each exchange of a lean meat  listed below contains about 7 grams of protein, 0 to 3 grams of fat, and 45 calories  The meat and meat substitutes food group does not contain any carbohydrates  Medium and high-fat meats have more calories  · 1 ounce of chicken or turkey without skin, or 1 ounce of fish (not breaded or fried)     · 1 ounce of lean beef, pork, or lamb     · 1-inch cube or 1 ounce of low-fat cheese     · 2 egg whites or ¼ cup of egg substitute     · ½ cup of tofu    Sweets, desserts, and other carbohydrate group exchanges:   · Sweets and other desserts:  Each exchange has about 15 grams of carbohydrate   ? 1 ounce of román food cake or 2-inch square cake (unfrosted)    ? 2 small cookies     ?  ½ cup of sugar-free, fat-free ice cream    ? 1 tablespoon of syrup, jam, jelly, table sugar, or honey    · Combination foods:     ? 1 cup of an entrée, such as lasagna, spaghetti with meatballs, macaroni and cheese, and chili with beans (each serving counts as 2 carbohydrate exchanges )     ? 1 cup of tomato or vegetable beef soup (each serving counts as 1 carbohydrate exchange )    Fat group exchanges:  Each exchange contains 5 grams of fat and 45 calories  · 1 teaspoon of oil (such as canola, olive, or corn oil)     · 6 almonds or cashews, 10 peanuts, or 4 pecan halves     · 2 tablespoons of avocado     · ½ tablespoon of peanut butter     · 1 teaspoon of regular margarine or 2 teaspoons of low-fat margarine     · 1 teaspoon of regular butter or 1 tablespoon of low-fat butter     · 1 teaspoon of regular mayonnaise or 1 tablespoon of low-fat mayonnaise     · 1 tablespoon of regular salad dressing or 2 tablespoons of low-fat salad dressing    Free foods: The foods on this list are called free foods because they have very few calories  Free foods usually do not increase your blood sugar if you limit them  · 1 tablespoon of catsup or taco sauce     · ¼ cup of salsa     · 2 tablespoons of sugar-free syrup or 2 teaspoons of light jam or jelly     · 1 tablespoon of fat-free salad dressing     · 4 tablespoons of fat-free margarine or fat-free mayonnaise     · Sugar-free drinks: diet soda, sugar-free drink mixes, or mineral water     · Low-sodium bouillon or fat-free broth     · Mustard     · Seasonings such as spices, herbs, and garlic     · Sugar-free gelatin without added fruit    Other healthy nutrition guidelines:   · Limit drinks with sugar substitutes  Your dietitian or healthcare provider will encourage you to drink water  Water helps your kidneys to function properly  Ask how much water you should drink every day  · Eat more fiber    Choose foods that are good sources of fiber, such as fruits, vegetables, and whole grains  Cereals that contain 5 or more grams of fiber per serving are good sources of fiber  Legumes such as garbanzo, gaytan beans, kidney beans, and lentils are also good sources  · Limit fat  Ask your dietitian or healthcare provider how much fat you should eat each day  Choose foods low in fat, saturated fat, trans fat, and cholesterol  Examples include turkey or chicken without the skin, fish, lean cuts of meat, and beans  Low-fat dairy foods, such as low-fat or fat-free milk and low-fat yogurt are also good choices  Omega-3 fatty acids are healthy fats that are found in canola oil, soybean oil and fatty fish  [place], albacore tuna, and sardines are good sources of omega 3 fatty acids  Eat 2 servings of these types of fish each week  Do not eat fried fish  · Limit sugar  Sugar and sweets must be counted toward the carbohydrate exchanges that you can have within your meal plan  Limit sugar and sweets because they are usually also high in calories and fat  Eat smaller portions of sweets by sharing a dessert or asking for a child-size portion at a restaurant  · Limit sodium  (salt) to about 2,300 mg per day  You may need to eat even less sodium if you have certain medical conditions  Foods high in sodium include soy sauce, potato chips, and soup  · Limit alcohol  Ask your healthcare provider if it is safe for you to drink alcohol  If alcohol is safe for you to have, eat a meal when you drink alcohol  If you drink alcohol on an empty stomach, your blood sugar may drop to a low level  Women 21 years or older and men 72 years or older should limit alcohol to 1 drink a day  Men aged 24 to 59 years should limit alcohol to 2 drinks a day  A drink of alcohol is 5 ounces of wine, 12 ounces of beer, or 1½ ounces of liquor  Other ways to manage your diabetes:   · Control your blood sugar level  Test your blood sugar level regularly and keep a record of the results   Ask your healthcare provider when and how often to test your blood sugar  You may need to check your blood sugar level at least 3 times each day  · Talk to your healthcare provider about your weight  Ask if you need to lose weight, and how much you need to lose  If you are overweight, you may need to make other changes to lose weight  Ask your healthcare provider to help you create a weight loss program      · Get regular physical activity  Physical activity can help decrease your blood sugar level  It can also help to decrease your risk for heart disease and help you lose weight  Adults should have moderate intensity physical activity for at least 150 minutes every week  Spread the amount of activity over at least 3 days a week  Do not skip more than 2 days in a row  Children should get at least 60 minutes of moderate physical activity on most days of the week  Examples of moderate physical activity include brisk walking, running, and swimming  Do not sit for longer than 30 minutes  Work with your healthcare provider to create a plan for physical activity  © Copyright [address] Information is for End User's use only and may not be sold, redistributed or otherwise used for commercial purposes  All illustrations and images included in CareNotes® are the copyrighted property of [employer]  or [address] [place] [address]  The above information is an  only  It is not intended as medical advice for individual conditions or treatments  Talk to your doctor, nurse or pharmacist before following any medical regimen to see if it is safe and effective for you

## 2021-04-09 NOTE — PROGRESS NOTES
ADULT ANNUAL [address]    NAME: Singh Garcia  AGE: 63 y o  SEX: male  : 1960     DATE: 2021     Assessment and Plan:     Problem List Items Addressed This Visit        Endocrine    Uncontrolled type 2 diabetes mellitus with hyperglycemia ([address] )      Patient presents for follow-up evaluation for uncontrolled diabetes and hypertension he has been trying to monitor his blood glucose while on medication and diet now  Lab Results   Component Value Date    HGBA1C 13 1 (A) 2021            Relevant Medications    glucose blood (Contour Next Test) test strip    Other Relevant Orders    Ambulatory Referral to Ophthalmology    Comprehensive metabolic panel    Hemoglobin A1C    Lipid panel       Cardiovascular and Mediastinum    Essential hypertension - Primary      Hypertension under better control now continue losartan 50 mg tablets         Relevant Orders    Comprehensive metabolic panel    Hemoglobin A1C    Lipid panel      Other Visit Diagnoses     Need for hepatitis C screening test        Relevant Orders    Comprehensive metabolic panel    Hemoglobin A1C    Lipid panel    Screening for HIV (human immunodeficiency virus)        Relevant Orders    Comprehensive metabolic panel    Hemoglobin A1C    Lipid panel    Screening for colorectal cancer        Relevant Orders    Comprehensive metabolic panel    Hemoglobin A1C    Lipid panel    Screening for colon cancer        Relevant Orders    Ambulatory referral to Gastroenterology    Comprehensive metabolic panel    Hemoglobin A1C    Lipid panel          Immunizations and preventive care screenings were discussed with patient today  Appropriate education was printed on patient's after visit summary  Counseling:  Alcohol/drug use: discussed moderation in alcohol intake, the recommendations for healthy alcohol use, and avoidance of illicit drug use    Dental Health: discussed importance of regular tooth brushing, flossing, and dental visits  Injury prevention: discussed safety/seat belts, safety helmets, smoke detectors, carbon dioxide detectors, and smoking near bedding or upholstery  Sexual health: discussed sexually transmitted diseases, partner selection, use of condoms, avoidance of unintended pregnancy, and contraceptive alternatives  · Exercise: the importance of regular exercise/physical activity was discussed  Recommend exercise 3-5 times per week for at least 30 minutes  Return in about 3 months (around 7/9/2021)  Chief Complaint:     Chief Complaint   Patient presents with    Follow-up     Lab review     Diabetes     has concerns with sugar levels patient has concerns with sugar levels maybe "wrong"      History of Present Illness:     Adult Annual Physical   Patient here for a comprehensive physical exam  The patient reports no problems  Diet and Physical Activity  · Diet/Nutrition: diabetic diet  · Exercise: walking  Depression Screening  PHQ-9 Depression Screening    PHQ-9:   Frequency of the following problems over the past two weeks:           General Health  · Sleep: gets 7-8 hours of sleep on average  · Hearing: normal - bilateral   · Vision: wears glasses  · Dental: regular dental visits   Health  · Symptoms include: none     Review of Systems:     Review of Systems   Constitutional: Negative for chills, fatigue and fever  HENT: Negative for congestion, nosebleeds, rhinorrhea, sinus pressure and sore throat  Eyes: Negative for discharge and redness  Respiratory: Negative for cough and shortness of breath  Cardiovascular: Negative for chest pain, palpitations and leg swelling  Gastrointestinal: Negative for abdominal pain, blood in stool and nausea  Endocrine: Positive for polydipsia and polyuria  Negative for cold intolerance and heat intolerance  Genitourinary: Negative for dysuria and frequency  Musculoskeletal: Negative for arthralgias, back pain and myalgias  Skin: Negative for rash  Neurological: Negative for dizziness, weakness and headaches  Hematological: Negative for adenopathy  Psychiatric/Behavioral: Negative for behavioral problems and sleep disturbance  The patient is not nervous/anxious         Past Medical History:     Past Medical History:   Diagnosis Date    Hypertension       Past Surgical History:     Past Surgical History:   Procedure Laterality Date    COLONOSCOPY  2017    GALLBLADDER SURGERY        Family History:     Family History   Problem Relation Age of Onset    Kidney failure Mother     Hypertension Mother     Colon cancer Father     Cancer Sister         bladder    Prostate cancer Brother     Kidney failure Brother     Bleeding Disorder Brother         leading factor 5    Kidney failure Brother       Social History:        Social History     Socioeconomic History    Marital status: /Civil Union     Spouse name: None    Number of children: None    Years of education: None    Highest education level: None   Occupational History    None   Social Needs    Financial resource strain: None    Food insecurity     Worry: None     Inability: None    Transportation needs     Medical: None     Non-medical: None   Tobacco Use    Smoking status: Never Smoker    Smokeless tobacco: Never Used   Substance and Sexual Activity    Alcohol use: Not Currently     Comment: rarely    Drug use: Never    Sexual activity: None   Lifestyle    Physical activity     Days per week: None     Minutes per session: None    Stress: None   Relationships    Social connections     Talks on phone: None     Gets together: None     Attends Mosque service: None     Active member of club or organization: None     Attends meetings of clubs or organizations: None     Relationship status: None    Intimate partner violence     Fear of current or ex partner: None     Emotionally abused: None     Physically abused: None     Forced sexual activity: None   Other Topics Concern    None   Social History Narrative    Daily Caffeine Use- 2 cup of coffee      Current Medications:     Current Outpatient Medications   Medication Sig Dispense Refill    Blood Glucose Monitoring Suppl (Contour Next Monitor) w/Device KIT Use 1 Device 2 (two) times a day 1 kit 3    clotrimazole-betamethasone (LOTRISONE) 1-0 05 % cream Apply topically 2 (two) times a day 45 g 1    Lancets (accu-chek soft touch) lancets Use as instructed 200 each 2    losartan (Cozaar) 50 mg tablet Take 1 tablet (50 mg total) by mouth daily 30 tablet 5    metFORMIN (GLUCOPHAGE-XR) 500 mg 24 hr tablet Take 1 tablet (500 mg total) by mouth daily with dinner 60 tablet 5    glucose blood (Contour Next Test) test strip Use as instructed 200 each 2     No current facility-administered medications for this visit  Allergies: Allergies   Allergen Reactions    Biaxin [Clarithromycin] Hives    Ceclor [Cefaclor] Hives      Physical Exam:     /84   Pulse 73   Temp 98 9 °F (37 2 °C)   Ht 5' 10" (1 778 m)   Wt 113 kg (250 lb)   SpO2 97%   BMI 35 87 kg/m²     Physical Exam  Vitals signs and nursing note reviewed  Constitutional:       Appearance: He is well-developed  He is obese  HENT:      Head: Normocephalic and atraumatic  Right Ear: Tympanic membrane normal       Left Ear: Tympanic membrane normal       Nose: Nose normal       Mouth/Throat:      Mouth: Mucous membranes are moist       Pharynx: Oropharynx is clear  Eyes:      Conjunctiva/sclera: Conjunctivae normal    Neck:      Musculoskeletal: Neck supple  Cardiovascular:      Rate and Rhythm: Normal rate and regular rhythm  Heart sounds: No murmur  Pulmonary:      Effort: Pulmonary effort is normal  No respiratory distress  Breath sounds: Normal breath sounds  Abdominal:      Palpations: Abdomen is soft  Tenderness:  There is no abdominal tenderness  Skin:     General: Skin is warm and dry  Neurological:      General: No focal deficit present  Mental Status: He is alert  Psychiatric:         Mood and Affect: Mood normal          Behavior: Behavior normal          Thought Content:  Thought content normal          Judgment: Judgment normal           DO DREW [address]

## 2021-04-14 DIAGNOSIS — E11.65 UNCONTROLLED TYPE 2 DIABETES MELLITUS WITH HYPERGLYCEMIA (HCC): Primary | ICD-10-CM

## 2021-04-14 RX ORDER — FLASH GLUCOSE SENSOR
1 KIT MISCELLANEOUS 4 TIMES DAILY
Qty: 1 EACH | Refills: 12 | Status: SHIPPED | OUTPATIENT
Start: 2021-04-14 | End: 2021-10-11

## 2021-04-14 RX ORDER — FLASH GLUCOSE SCANNING READER
1 EACH MISCELLANEOUS
Qty: 1 DEVICE | Refills: 26 | Status: SHIPPED | OUTPATIENT
Start: 2021-04-14

## 2021-05-07 ENCOUNTER — TELEPHONE (OUTPATIENT)
Dept: FAMILY MEDICINE CLINIC | Facility: CLINIC | Age: 61
End: 2021-05-07

## 2021-05-07 NOTE — TELEPHONE ENCOUNTER
Pt noticing that after he has lost some more weight, his sugar seems to be low in the 60-70 and slightly lightheaded  He wants to know what he should do about taking his medication, as he is taking the metformin 500 mg once daily   His BP has been normal

## 2021-05-24 LAB
LEFT EYE DIABETIC RETINOPATHY: NORMAL
RIGHT EYE DIABETIC RETINOPATHY: NORMAL

## 2021-07-02 ENCOUNTER — APPOINTMENT (OUTPATIENT)
Dept: LAB | Facility: CLINIC | Age: 61
End: 2021-07-02
Payer: COMMERCIAL

## 2021-07-02 DIAGNOSIS — E11.65 UNCONTROLLED TYPE 2 DIABETES MELLITUS WITH HYPERGLYCEMIA (HCC): ICD-10-CM

## 2021-07-02 DIAGNOSIS — I10 ESSENTIAL HYPERTENSION: ICD-10-CM

## 2021-07-02 DIAGNOSIS — Z11.4 SCREENING FOR HIV (HUMAN IMMUNODEFICIENCY VIRUS): ICD-10-CM

## 2021-07-02 DIAGNOSIS — Z11.59 NEED FOR HEPATITIS C SCREENING TEST: ICD-10-CM

## 2021-07-02 DIAGNOSIS — Z12.11 SCREENING FOR COLORECTAL CANCER: ICD-10-CM

## 2021-07-02 DIAGNOSIS — Z12.11 SCREENING FOR COLON CANCER: ICD-10-CM

## 2021-07-02 DIAGNOSIS — Z12.12 SCREENING FOR COLORECTAL CANCER: ICD-10-CM

## 2021-07-02 LAB
ALBUMIN SERPL BCP-MCNC: 3.6 G/DL (ref 3.5–5)
ALP SERPL-CCNC: 66 U/L (ref 46–116)
ALT SERPL W P-5'-P-CCNC: 37 U/L (ref 12–78)
ANION GAP SERPL CALCULATED.3IONS-SCNC: 5 MMOL/L (ref 4–13)
AST SERPL W P-5'-P-CCNC: 24 U/L (ref 5–45)
BILIRUB SERPL-MCNC: 0.54 MG/DL (ref 0.2–1)
BUN SERPL-MCNC: 21 MG/DL (ref 5–25)
CALCIUM SERPL-MCNC: 9.7 MG/DL (ref 8.3–10.1)
CHLORIDE SERPL-SCNC: 104 MMOL/L (ref 100–108)
CHOLEST SERPL-MCNC: 187 MG/DL (ref 50–200)
CO2 SERPL-SCNC: 28 MMOL/L (ref 21–32)
CREAT SERPL-MCNC: 1.12 MG/DL (ref 0.6–1.3)
EST. AVERAGE GLUCOSE BLD GHB EST-MCNC: 120 MG/DL
GFR SERPL CREATININE-BSD FRML MDRD: 72 ML/MIN/1.73SQ M
GLUCOSE P FAST SERPL-MCNC: 85 MG/DL (ref 65–99)
HBA1C MFR BLD: 5.8 %
HDLC SERPL-MCNC: 37 MG/DL
LDLC SERPL CALC-MCNC: 135 MG/DL (ref 0–100)
NONHDLC SERPL-MCNC: 150 MG/DL
POTASSIUM SERPL-SCNC: 4.7 MMOL/L (ref 3.5–5.3)
PROT SERPL-MCNC: 7.4 G/DL (ref 6.4–8.2)
SODIUM SERPL-SCNC: 137 MMOL/L (ref 136–145)
TRIGL SERPL-MCNC: 76 MG/DL

## 2021-07-02 PROCEDURE — 80053 COMPREHEN METABOLIC PANEL: CPT

## 2021-07-02 PROCEDURE — 36415 COLL VENOUS BLD VENIPUNCTURE: CPT

## 2021-07-02 PROCEDURE — 83036 HEMOGLOBIN GLYCOSYLATED A1C: CPT

## 2021-07-02 PROCEDURE — 80061 LIPID PANEL: CPT

## 2021-08-09 ENCOUNTER — OFFICE VISIT (OUTPATIENT)
Dept: FAMILY MEDICINE CLINIC | Facility: CLINIC | Age: 61
End: 2021-08-09
Payer: COMMERCIAL

## 2021-08-09 VITALS
TEMPERATURE: 97.6 F | SYSTOLIC BLOOD PRESSURE: 138 MMHG | HEART RATE: 70 BPM | RESPIRATION RATE: 18 BRPM | DIASTOLIC BLOOD PRESSURE: 88 MMHG | BODY MASS INDEX: 32.78 KG/M2 | WEIGHT: 229 LBS | OXYGEN SATURATION: 97 % | HEIGHT: 70 IN

## 2021-08-09 DIAGNOSIS — Z11.4 SCREENING FOR HIV (HUMAN IMMUNODEFICIENCY VIRUS): ICD-10-CM

## 2021-08-09 DIAGNOSIS — Z11.59 NEED FOR HEPATITIS C SCREENING TEST: ICD-10-CM

## 2021-08-09 DIAGNOSIS — Z12.11 SCREENING FOR COLORECTAL CANCER: ICD-10-CM

## 2021-08-09 DIAGNOSIS — I10 ESSENTIAL HYPERTENSION: ICD-10-CM

## 2021-08-09 DIAGNOSIS — Z12.12 SCREENING FOR COLORECTAL CANCER: ICD-10-CM

## 2021-08-09 DIAGNOSIS — E11.65 UNCONTROLLED TYPE 2 DIABETES MELLITUS WITH HYPERGLYCEMIA (HCC): Primary | ICD-10-CM

## 2021-08-09 PROCEDURE — 3075F SYST BP GE 130 - 139MM HG: CPT | Performed by: FAMILY MEDICINE

## 2021-08-09 PROCEDURE — 99213 OFFICE O/P EST LOW 20 MIN: CPT | Performed by: FAMILY MEDICINE

## 2021-08-09 PROCEDURE — 1036F TOBACCO NON-USER: CPT | Performed by: FAMILY MEDICINE

## 2021-08-09 PROCEDURE — 3079F DIAST BP 80-89 MM HG: CPT | Performed by: FAMILY MEDICINE

## 2021-08-09 PROCEDURE — 3008F BODY MASS INDEX DOCD: CPT | Performed by: FAMILY MEDICINE
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
## 2021-08-09 NOTE — ASSESSMENT & PLAN NOTE
Lab Results   Component Value Date    HGBA1C 5 8 (H) 07/02/2021    diabetes under better control now with his last A1c at 5 8 in July he is doing better overall with his diet and exercise program he is at 229 lb now down from 263 in March    Continuing with metformin once daily

## 2021-08-09 NOTE — PROGRESS NOTES
BMI Counseling: Body mass index is 32 86 kg/m²  The BMI is above normal  Nutrition recommendations include reducing portion sizes, decreasing overall calorie intake, reducing fast food intake, decreasing soda and/or juice intake, moderation in carbohydrate intake, increasing intake of lean protein, reducing intake of saturated fat and trans fat and reducing intake of cholesterol  Exercise recommendations include exercising 3-5 times per week
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
## 2021-08-10 ENCOUNTER — TELEPHONE (OUTPATIENT)
Dept: ADMINISTRATIVE | Facility: OTHER | Age: 61
End: 2021-08-10

## 2021-08-10 NOTE — TELEPHONE ENCOUNTER
Upon review of the In Basket request and the patient's chart, initial outreach has been made via fax, please see Contacts section for details       Thank you  Snow Putnam, [address]  [phone] Fax [phone]

## 2021-08-10 NOTE — TELEPHONE ENCOUNTER
----- Message from Artem Rivera sent at 8/10/2021  8:51 AM EDT -----  Regarding: DM Eye Exam  08/10/21 8:52 AM    Hello, our patient Oh Faulkner has had Diabetic Eye Exam completed/performed  Please assist in updating the patient chart by making an External outreach to [facility] facility located in [place], [place]  The date of service is within the last year  Thank you,  BELEN Mitchell PG

## 2021-08-10 NOTE — LETTER
Diabetic Eye Exam Form    Date Requested: 08/10/21  Patient: Laxmi Garland  Patient : 1960   Referring Provider: Richard     Dilated Retinal Exam, Optomap-Iris Exam, or Fundus Photography Done         Yes (Ruby one above)         No     Date of Diabetic Eye Exam ______________________________  Left Eye      Exam did show retinopathy    Exam did not show retinopathy         Mild       Moderate       None       Proliferative       Severe     Right Eye     Exam did show retinopathy    Exam did not show retinopathy         Mild       Moderate       None       Proliferative       Severe     Comments __________________________________________________________    Practice Providing Exam ______________________________________________    Exam Performed By (print name) _______________________________________      Provider Signature ___________________________________________________      These reports are needed for  compliance    Please fax this completed form and a copy of the Diabetic Eye Exam report to our office located at [place] [address] as soon as possible to [phone] dimas Horner: Phone [phone]    We thank you for your assistance in treating our mutual patient

## 2021-08-11 NOTE — TELEPHONE ENCOUNTER
Upon review of the In Basket request we were able to locate, review, and update the patient chart as requested for Diabetic Eye Exam     Any additional questions or concerns should be emailed to the Practice Liaisons via [email or web address] email, please do not reply via In Basket      Thank you  Candance Felty, [place]

## 2021-08-28 DIAGNOSIS — E11.65 UNCONTROLLED TYPE 2 DIABETES MELLITUS WITH HYPERGLYCEMIA (HCC): ICD-10-CM

## 2021-08-28 DIAGNOSIS — I10 ESSENTIAL HYPERTENSION: ICD-10-CM

## 2021-08-30 PROCEDURE — 4010F ACE/ARB THERAPY RXD/TAKEN: CPT | Performed by: FAMILY MEDICINE

## 2021-08-30 RX ORDER — LOSARTAN POTASSIUM 50 MG/1
TABLET ORAL
Qty: 30 TABLET | Refills: 5 | Status: SHIPPED | OUTPATIENT
Start: 2021-08-30 | End: 2022-03-07

## 2021-10-09 DIAGNOSIS — E11.65 UNCONTROLLED TYPE 2 DIABETES MELLITUS WITH HYPERGLYCEMIA (HCC): ICD-10-CM

## 2021-10-11 RX ORDER — FLASH GLUCOSE SENSOR
KIT MISCELLANEOUS
Qty: 3 EACH | Refills: 2 | Status: SHIPPED | OUTPATIENT
Start: 2021-10-11 | End: 2022-02-16

## 2021-10-19 ENCOUNTER — OFFICE VISIT (OUTPATIENT)
Dept: FAMILY MEDICINE CLINIC | Facility: CLINIC | Age: 61
End: 2021-10-19
Payer: COMMERCIAL

## 2021-10-19 VITALS — RESPIRATION RATE: 20 BRPM | HEART RATE: 76 BPM | TEMPERATURE: 97.9 F | OXYGEN SATURATION: 94 %

## 2021-10-19 DIAGNOSIS — J01.10 ACUTE NON-RECURRENT FRONTAL SINUSITIS: Primary | ICD-10-CM

## 2021-10-19 PROCEDURE — 99213 OFFICE O/P EST LOW 20 MIN: CPT | Performed by: FAMILY MEDICINE

## 2021-10-19 PROCEDURE — 1036F TOBACCO NON-USER: CPT | Performed by: FAMILY MEDICINE

## 2021-10-19 RX ORDER — LEVOFLOXACIN 500 MG/1
500 TABLET, FILM COATED ORAL EVERY 24 HOURS
Qty: 7 TABLET | Refills: 0 | Status: SHIPPED | OUTPATIENT
Start: 2021-10-19 | End: 2021-10-26

## 2021-11-12 ENCOUNTER — APPOINTMENT (OUTPATIENT)
Dept: LAB | Facility: HOSPITAL | Age: 61
End: 2021-11-12
Payer: COMMERCIAL

## 2021-11-12 DIAGNOSIS — Z12.11 SCREENING FOR COLORECTAL CANCER: ICD-10-CM

## 2021-11-12 DIAGNOSIS — Z11.4 SCREENING FOR HIV (HUMAN IMMUNODEFICIENCY VIRUS): ICD-10-CM

## 2021-11-12 DIAGNOSIS — Z12.12 SCREENING FOR COLORECTAL CANCER: ICD-10-CM

## 2021-11-12 DIAGNOSIS — I10 ESSENTIAL HYPERTENSION: ICD-10-CM

## 2021-11-12 DIAGNOSIS — E11.65 UNCONTROLLED TYPE 2 DIABETES MELLITUS WITH HYPERGLYCEMIA (HCC): ICD-10-CM

## 2021-11-12 DIAGNOSIS — Z11.59 NEED FOR HEPATITIS C SCREENING TEST: ICD-10-CM

## 2021-11-12 LAB
ALBUMIN SERPL BCP-MCNC: 3.8 G/DL (ref 3.5–5)
ALP SERPL-CCNC: 60 U/L (ref 46–116)
ALT SERPL W P-5'-P-CCNC: 48 U/L (ref 12–78)
ANION GAP SERPL CALCULATED.3IONS-SCNC: 8 MMOL/L (ref 4–13)
AST SERPL W P-5'-P-CCNC: 19 U/L (ref 5–45)
BILIRUB SERPL-MCNC: 0.73 MG/DL (ref 0.2–1)
BUN SERPL-MCNC: 18 MG/DL (ref 5–25)
CALCIUM SERPL-MCNC: 9 MG/DL (ref 8.3–10.1)
CHLORIDE SERPL-SCNC: 101 MMOL/L (ref 100–108)
CHOLEST SERPL-MCNC: 222 MG/DL (ref 50–200)
CO2 SERPL-SCNC: 30 MMOL/L (ref 21–32)
CREAT SERPL-MCNC: 1.19 MG/DL (ref 0.6–1.3)
GFR SERPL CREATININE-BSD FRML MDRD: 66 ML/MIN/1.73SQ M
GLUCOSE P FAST SERPL-MCNC: 95 MG/DL (ref 65–99)
HDLC SERPL-MCNC: 41 MG/DL
LDLC SERPL CALC-MCNC: 164 MG/DL (ref 0–100)
NONHDLC SERPL-MCNC: 181 MG/DL
POTASSIUM SERPL-SCNC: 4.4 MMOL/L (ref 3.5–5.3)
PROT SERPL-MCNC: 7.6 G/DL (ref 6.4–8.2)
SODIUM SERPL-SCNC: 139 MMOL/L (ref 136–145)
TRIGL SERPL-MCNC: 86 MG/DL

## 2021-11-12 PROCEDURE — 80053 COMPREHEN METABOLIC PANEL: CPT

## 2021-11-12 PROCEDURE — 83036 HEMOGLOBIN GLYCOSYLATED A1C: CPT

## 2021-11-12 PROCEDURE — 36415 COLL VENOUS BLD VENIPUNCTURE: CPT

## 2021-11-12 PROCEDURE — 80061 LIPID PANEL: CPT

## 2021-11-13 LAB
EST. AVERAGE GLUCOSE BLD GHB EST-MCNC: 123 MG/DL
HBA1C MFR BLD: 5.9 %

## 2021-11-13 PROCEDURE — 3044F HG A1C LEVEL LT 7.0%: CPT | Performed by: FAMILY MEDICINE

## 2021-11-15 ENCOUNTER — OFFICE VISIT (OUTPATIENT)
Dept: FAMILY MEDICINE CLINIC | Facility: CLINIC | Age: 61
End: 2021-11-15
Payer: COMMERCIAL

## 2021-11-15 VITALS
WEIGHT: 239 LBS | SYSTOLIC BLOOD PRESSURE: 136 MMHG | OXYGEN SATURATION: 95 % | DIASTOLIC BLOOD PRESSURE: 80 MMHG | BODY MASS INDEX: 34.22 KG/M2 | HEART RATE: 77 BPM | HEIGHT: 70 IN

## 2021-11-15 DIAGNOSIS — Z12.11 SCREENING FOR COLON CANCER: ICD-10-CM

## 2021-11-15 DIAGNOSIS — Z12.12 SCREENING FOR COLORECTAL CANCER: Primary | ICD-10-CM

## 2021-11-15 DIAGNOSIS — E11.65 UNCONTROLLED TYPE 2 DIABETES MELLITUS WITH HYPERGLYCEMIA (HCC): ICD-10-CM

## 2021-11-15 DIAGNOSIS — E78.2 MIXED HYPERLIPIDEMIA: ICD-10-CM

## 2021-11-15 DIAGNOSIS — Z12.11 SCREENING FOR COLORECTAL CANCER: Primary | ICD-10-CM

## 2021-11-15 DIAGNOSIS — I10 ESSENTIAL HYPERTENSION: ICD-10-CM

## 2021-11-15 PROCEDURE — 3725F SCREEN DEPRESSION PERFORMED: CPT | Performed by: FAMILY MEDICINE

## 2021-11-15 PROCEDURE — 3075F SYST BP GE 130 - 139MM HG: CPT | Performed by: FAMILY MEDICINE

## 2021-11-15 PROCEDURE — 99396 PREV VISIT EST AGE 40-64: CPT | Performed by: FAMILY MEDICINE

## 2021-11-15 PROCEDURE — 3079F DIAST BP 80-89 MM HG: CPT | Performed by: FAMILY MEDICINE

## 2021-11-15 PROCEDURE — 3008F BODY MASS INDEX DOCD: CPT | Performed by: FAMILY MEDICINE

## 2021-11-15 PROCEDURE — 1036F TOBACCO NON-USER: CPT | Performed by: FAMILY MEDICINE

## 2022-02-14 DIAGNOSIS — B35.6 TINEA CRURIS: ICD-10-CM

## 2022-02-14 DIAGNOSIS — E11.65 UNCONTROLLED TYPE 2 DIABETES MELLITUS WITH HYPERGLYCEMIA (HCC): ICD-10-CM

## 2022-02-14 DIAGNOSIS — I10 ESSENTIAL HYPERTENSION: ICD-10-CM

## 2022-02-14 DIAGNOSIS — R73.9 HYPERGLYCEMIA: ICD-10-CM

## 2022-02-14 RX ORDER — METFORMIN HYDROCHLORIDE 500 MG/1
TABLET, EXTENDED RELEASE ORAL
Qty: 60 TABLET | Refills: 5 | Status: SHIPPED | OUTPATIENT
Start: 2022-02-14

## 2022-02-15 DIAGNOSIS — E11.65 UNCONTROLLED TYPE 2 DIABETES MELLITUS WITH HYPERGLYCEMIA (HCC): ICD-10-CM

## 2022-02-16 RX ORDER — FLASH GLUCOSE SENSOR
KIT MISCELLANEOUS
Qty: 1 EACH | Refills: 5 | Status: SHIPPED | OUTPATIENT
Start: 2022-02-16 | End: 2022-05-16

## 2022-03-06 DIAGNOSIS — I10 ESSENTIAL HYPERTENSION: ICD-10-CM

## 2022-03-06 DIAGNOSIS — E11.65 UNCONTROLLED TYPE 2 DIABETES MELLITUS WITH HYPERGLYCEMIA (HCC): ICD-10-CM

## 2022-03-07 RX ORDER — LOSARTAN POTASSIUM 50 MG/1
TABLET ORAL
Qty: 30 TABLET | Refills: 5 | Status: SHIPPED | OUTPATIENT
Start: 2022-03-07

## 2022-05-15 DIAGNOSIS — E11.65 UNCONTROLLED TYPE 2 DIABETES MELLITUS WITH HYPERGLYCEMIA (HCC): ICD-10-CM

## 2022-05-16 RX ORDER — FLASH GLUCOSE SENSOR
KIT MISCELLANEOUS
Qty: 2 EACH | Refills: 3 | Status: SHIPPED | OUTPATIENT
Start: 2022-05-16

## 2022-10-03 DIAGNOSIS — E11.65 UNCONTROLLED TYPE 2 DIABETES MELLITUS WITH HYPERGLYCEMIA (HCC): ICD-10-CM

## 2022-10-03 DIAGNOSIS — I10 ESSENTIAL HYPERTENSION: ICD-10-CM

## 2022-10-04 RX ORDER — LOSARTAN POTASSIUM 50 MG/1
TABLET ORAL
Qty: 30 TABLET | Refills: 0 | Status: SHIPPED | OUTPATIENT
Start: 2022-10-04

## 2022-10-12 PROBLEM — J01.10 ACUTE NON-RECURRENT FRONTAL SINUSITIS: Status: RESOLVED | Noted: 2021-10-19 | Resolved: 2022-10-12

## 2022-10-14 DIAGNOSIS — E11.65 UNCONTROLLED TYPE 2 DIABETES MELLITUS WITH HYPERGLYCEMIA (HCC): ICD-10-CM

## 2022-10-17 RX ORDER — FLASH GLUCOSE SENSOR
KIT MISCELLANEOUS
Qty: 1 EACH | Refills: 3 | Status: SHIPPED | OUTPATIENT
Start: 2022-10-17

## 2022-11-06 DIAGNOSIS — E11.65 UNCONTROLLED TYPE 2 DIABETES MELLITUS WITH HYPERGLYCEMIA (HCC): ICD-10-CM

## 2022-11-06 DIAGNOSIS — I10 ESSENTIAL HYPERTENSION: ICD-10-CM

## 2022-11-07 RX ORDER — LOSARTAN POTASSIUM 50 MG/1
TABLET ORAL
Qty: 30 TABLET | Refills: 0 | Status: SHIPPED | OUTPATIENT
Start: 2022-11-07

## 2022-11-22 ENCOUNTER — OFFICE VISIT (OUTPATIENT)
Dept: FAMILY MEDICINE CLINIC | Facility: CLINIC | Age: 62
End: 2022-11-22

## 2022-11-22 VITALS
RESPIRATION RATE: 18 BRPM | TEMPERATURE: 98.3 F | WEIGHT: 270.6 LBS | HEART RATE: 88 BPM | SYSTOLIC BLOOD PRESSURE: 160 MMHG | OXYGEN SATURATION: 96 % | HEIGHT: 70 IN | BODY MASS INDEX: 38.74 KG/M2 | DIASTOLIC BLOOD PRESSURE: 80 MMHG

## 2022-11-22 DIAGNOSIS — Z00.00 ANNUAL PHYSICAL EXAM: ICD-10-CM

## 2022-11-22 DIAGNOSIS — R35.1 NOCTURIA: ICD-10-CM

## 2022-11-22 DIAGNOSIS — E78.2 MIXED HYPERLIPIDEMIA: ICD-10-CM

## 2022-11-22 DIAGNOSIS — E11.65 UNCONTROLLED TYPE 2 DIABETES MELLITUS WITH HYPERGLYCEMIA (HCC): ICD-10-CM

## 2022-11-22 DIAGNOSIS — R73.9 HYPERGLYCEMIA: ICD-10-CM

## 2022-11-22 DIAGNOSIS — I10 ESSENTIAL HYPERTENSION: Primary | ICD-10-CM

## 2022-11-22 NOTE — PROGRESS NOTES
ADULT ANNUAL [address] [facility]    NAME: Wendi Pacheco  AGE: 63 y o   SEX: male  : 1960     DATE: 2022     Assessment and Plan:     Problem List Items Addressed This Visit        Endocrine    Uncontrolled type 2 diabetes mellitus with hyperglycemia ([address] )       Lab Results   Component Value Date    HGBA1C 5 9 (H) 2021   Repeat laboratory work every 6 months continue to monitor closely with continuous glucose monitor most recent A1c stable at 5 9 continue with metformin work on diet weight loss and exercise         Relevant Orders    Ambulatory referral for colonoscopy    CBC and differential    Comprehensive metabolic panel    Lipid panel    Hemoglobin A1C    TSH, 3rd generation with Free T4 reflex       Cardiovascular and Mediastinum    Essential hypertension - Primary     Patient will continue with losartan 50 mg working on weight reduction diet and exercise avoiding sodium         Relevant Orders    Ambulatory referral for colonoscopy    CBC and differential    Comprehensive metabolic panel    Lipid panel    Hemoglobin A1C    TSH, 3rd generation with Free T4 reflex       Other    Hyperglycemia     Hyperglycemia stable monitor laboratory work         Relevant Orders    Ambulatory referral for colonoscopy    CBC and differential    Comprehensive metabolic panel    Lipid panel    Hemoglobin A1C    TSH, 3rd generation with Free T4 reflex    Mixed hyperlipidemia     Mixed hyperlipidemia stable monitor laboratory work follow-up lipid profile in 6 months         Relevant Orders    Ambulatory referral for colonoscopy    CBC and differential    Comprehensive metabolic panel    Lipid panel    Hemoglobin A1C    TSH, 3rd generation with Free T4 reflex    Annual physical exam   Other Visit Diagnoses     Nocturia        Relevant Orders    PSA, total and free          Immunizations and preventive care screenings were discussed with patient today  Appropriate education was printed on patient's after visit summary  Discussed risks and benefits of prostate cancer screening  We discussed the controversial history of PSA screening for prostate cancer in the [place] as well as the risk of over detection and over treatment of prostate cancer by way of PSA screening  The patient understands that PSA blood testing is an imperfect way to screen for prostate cancer and that elevated PSA levels in the blood may also be caused by infection, inflammation, prostatic trauma or manipulation, urological procedures, or by benign prostatic enlargement  The role of the digital rectal examination in prostate cancer screening was also discussed and I discussed with him that there is large interobserver variability in the findings of digital rectal examination  Counseling:  Alcohol/drug use: discussed moderation in alcohol intake, the recommendations for healthy alcohol use, and avoidance of illicit drug use  Dental Health: discussed importance of regular tooth brushing, flossing, and dental visits  Injury prevention: discussed safety/seat belts, safety helmets, smoke detectors, carbon dioxide detectors, and smoking near bedding or upholstery  Sexual health: discussed sexually transmitted diseases, partner selection, use of condoms, avoidance of unintended pregnancy, and contraceptive alternatives  · Exercise: the importance of regular exercise/physical activity was discussed  Recommend exercise 3-5 times per week for at least 30 minutes  Return for Next scheduled follow up  Chief Complaint:     Chief Complaint   Patient presents with   • Follow-up     Medication review      History of Present Illness:     Adult Annual Physical   Patient here for a comprehensive physical exam  The patient reports no problems  Diet and Physical Activity  · Diet/Nutrition: well balanced diet  · Exercise: no formal exercise        Depression Screening  PHQ-2/9 Depression Screening    Little interest or pleasure in doing things: 0 - not at all  Feeling down, depressed, or hopeless: 0 - not at all  PHQ-2 Score: 0  PHQ-2 Interpretation: Negative depression screen       General Health  · Sleep: sleeps well  · Hearing: normal - bilateral   · Vision: no vision problems  · Dental: regular dental visits   Health  · Symptoms include: none     Review of Systems:     Review of Systems   Constitutional: Negative for chills, fatigue and fever  HENT: Negative for congestion, nosebleeds, rhinorrhea, sinus pressure and sore throat  Eyes: Negative for discharge and redness  Respiratory: Negative for cough and shortness of breath  Cardiovascular: Negative for chest pain, palpitations and leg swelling  Gastrointestinal: Negative for abdominal pain, blood in stool and nausea  Endocrine: Negative for cold intolerance, heat intolerance and polyuria  Genitourinary: Negative for dysuria and frequency  Musculoskeletal: Negative for arthralgias, back pain and myalgias  Skin: Negative for rash  Neurological: Negative for dizziness, weakness and headaches  Hematological: Negative for adenopathy  Psychiatric/Behavioral: Negative for behavioral problems and sleep disturbance  The patient is not nervous/anxious         Past Medical History:     Past Medical History:   Diagnosis Date   • Hypertension       Past Surgical History:     Past Surgical History:   Procedure Laterality Date   • COLONOSCOPY  2017   • GALLBLADDER SURGERY        Family History:     Family History   Problem Relation Age of Onset   • Kidney failure Mother    • Hypertension Mother    • Colon cancer Father    • Cancer Sister         bladder   • Prostate cancer Brother    • Kidney failure Brother    • Bleeding Disorder Brother         leading factor 5   • Kidney failure Brother       Social History:     Social History     Socioeconomic History   • Marital status: /Civil Union     Spouse name: None   • Number of children: None   • Years of education: None   • Highest education level: None   Occupational History   • None   Tobacco Use   • Smoking status: Never   • Smokeless tobacco: Never   Vaping Use   • Vaping Use: Never used   Substance and Sexual Activity   • Alcohol use: Not Currently     Comment: rarely   • Drug use: Never   • Sexual activity: None   Other Topics Concern   • None   Social History Narrative    Daily Caffeine Use- 2 cup of coffee     Social Determinants of Health     Financial Resource Strain: Not on file   Food Insecurity: Not on file   Transportation Needs: Not on file   Physical Activity: Not on file   Stress: Not on file   Social Connections: Not on file   Intimate Partner Violence: Not on file   Housing Stability: Not on file      Current Medications:     Current Outpatient Medications   Medication Sig Dispense Refill   • Blood Glucose Monitoring Suppl (Contour Next Monitor) w/Device KIT Use 1 Device 2 (two) times a day 1 kit 3   • Continuous Blood Gluc  (FreeStyle Zuhair 14 Day [place]) AKHIL Use 1 applicator every 14 (fourteen) days 1 Device 26   • Continuous Blood Gluc Sensor (FreeStyle Zuhair 14 Day Sensor) MISC use 1 APPLICATOR four times a day 1 each 3   • glucose blood (Contour Next Test) test strip Use as instructed 200 each 2   • Lancets (accu-chek soft touch) lancets Use as instructed 200 each 2   • losartan (COZAAR) 50 mg tablet take 1 tablet by mouth once daily 30 tablet 0   • metFORMIN (GLUCOPHAGE-XR) 500 mg 24 hr tablet take 1 tablet by mouth daily WITH DINNER 60 tablet 5     No current facility-administered medications for this visit  Allergies:      Allergies   Allergen Reactions   • Biaxin [Clarithromycin] Hives   • Ceclor [Cefaclor] Hives      Physical Exam:     /80 (BP Location: Left arm, Patient Position: Sitting, Cuff Size: Large)   Pulse 88   Temp 98 3 °F (36 8 °C)   Resp 18   Ht 5' 10" (1 778 m)   Wt 123 kg (270 lb 9 6 oz)   SpO2 96%   BMI 38 83 kg/m²     Physical Exam  Vitals and nursing note reviewed  Constitutional:       General: He is not in acute distress  Appearance: Normal appearance  He is well-developed  He is obese  HENT:      Head: Normocephalic and atraumatic  Right Ear: Tympanic membrane, ear canal and external ear normal       Left Ear: Tympanic membrane, ear canal and external ear normal       Nose: Nose normal       Mouth/Throat:      Mouth: Mucous membranes are moist    Eyes:      Extraocular Movements: Extraocular movements intact  Conjunctiva/sclera: Conjunctivae normal       Pupils: Pupils are equal, round, and reactive to light  Cardiovascular:      Rate and Rhythm: Normal rate and regular rhythm  Pulses: Normal pulses  no weak pulses          Dorsalis pedis pulses are 2+ on the right side and 2+ on the left side  Posterior tibial pulses are 2+ on the right side and 2+ on the left side  Heart sounds: Normal heart sounds  No murmur heard  Pulmonary:      Effort: Pulmonary effort is normal  No respiratory distress  Breath sounds: Normal breath sounds  Abdominal:      General: Bowel sounds are normal       Palpations: Abdomen is soft  Tenderness: There is no abdominal tenderness  Musculoskeletal:         General: No swelling  Normal range of motion  Cervical back: Neck supple  Feet:      Right foot:      Skin integrity: No ulcer, skin breakdown, erythema, warmth, callus or dry skin  Left foot:      Skin integrity: No ulcer, skin breakdown, erythema, warmth, callus or dry skin  Skin:     General: Skin is warm and dry  Capillary Refill: Capillary refill takes less than 2 seconds  Neurological:      General: No focal deficit present  Mental Status: He is alert and oriented to person, place, and time  Mental status is at baseline     Psychiatric:         Mood and Affect: Mood normal          Behavior: Behavior normal          Thought Content: Thought content normal          Judgment: Judgment normal           DO GAEL Sparks [facility] Foot Exam    Patient's shoes and socks removed  Right Foot/Ankle   Right Foot Inspection  Skin Exam: skin normal and skin intact  No dry skin, no warmth, no callus, no erythema, no maceration, no abnormal color, no pre-ulcer, no ulcer and no callus  Toe Exam: ROM and strength within normal limits  Sensory   Monofilament testing: intact    Vascular  Capillary refills: < 3 seconds  The right DP pulse is 2+  The right PT pulse is 2+  Left Foot/Ankle  Left Foot Inspection  Skin Exam: skin normal and skin intact  No dry skin, no warmth, no erythema, no maceration, normal color, no pre-ulcer, no ulcer and no callus  Toe Exam: ROM and strength within normal limits  Sensory   Monofilament testing: intact    Vascular  Capillary refills: < 3 seconds  The left DP pulse is 2+  The left PT pulse is 2+       Assign Risk Category  No deformity present  No loss of protective sensation  No weak pulses  Risk: 0

## 2022-11-22 NOTE — ASSESSMENT & PLAN NOTE
Patient will continue with losartan 50 mg working on weight reduction diet and exercise avoiding sodium

## 2022-11-22 NOTE — ASSESSMENT & PLAN NOTE
Lab Results   Component Value Date    HGBA1C 5 9 (H) 11/12/2021   Repeat laboratory work every 6 months continue to monitor closely with continuous glucose monitor most recent A1c stable at 5 9 continue with metformin work on diet weight loss and exercise

## 2022-12-07 DIAGNOSIS — I10 ESSENTIAL HYPERTENSION: ICD-10-CM

## 2022-12-07 DIAGNOSIS — E11.65 UNCONTROLLED TYPE 2 DIABETES MELLITUS WITH HYPERGLYCEMIA (HCC): ICD-10-CM

## 2022-12-07 RX ORDER — LOSARTAN POTASSIUM 50 MG/1
TABLET ORAL
Qty: 30 TABLET | Refills: 0 | Status: SHIPPED | OUTPATIENT
Start: 2022-12-07

## 2022-12-11 DIAGNOSIS — E11.65 UNCONTROLLED TYPE 2 DIABETES MELLITUS WITH HYPERGLYCEMIA (HCC): ICD-10-CM

## 2022-12-12 RX ORDER — FLASH GLUCOSE SENSOR
KIT MISCELLANEOUS
Qty: 1 EACH | Refills: 2 | Status: SHIPPED | OUTPATIENT
Start: 2022-12-12

## 2022-12-19 DIAGNOSIS — Z12.11 SCREENING FOR COLON CANCER: Primary | ICD-10-CM

## 2023-01-05 DIAGNOSIS — I10 ESSENTIAL HYPERTENSION: ICD-10-CM

## 2023-01-05 DIAGNOSIS — E11.65 UNCONTROLLED TYPE 2 DIABETES MELLITUS WITH HYPERGLYCEMIA (HCC): ICD-10-CM

## 2023-01-05 RX ORDER — LOSARTAN POTASSIUM 50 MG/1
TABLET ORAL
Qty: 30 TABLET | Refills: 0 | Status: SHIPPED | OUTPATIENT
Start: 2023-01-05

## 2023-02-17 DIAGNOSIS — E11.65 UNCONTROLLED TYPE 2 DIABETES MELLITUS WITH HYPERGLYCEMIA (HCC): ICD-10-CM

## 2023-02-17 RX ORDER — FLASH GLUCOSE SENSOR
KIT MISCELLANEOUS
Qty: 2 EACH | Refills: 1 | Status: SHIPPED | OUTPATIENT
Start: 2023-02-17

## 2023-02-22 DIAGNOSIS — E11.65 UNCONTROLLED TYPE 2 DIABETES MELLITUS WITH HYPERGLYCEMIA (HCC): ICD-10-CM

## 2023-02-22 DIAGNOSIS — I10 ESSENTIAL HYPERTENSION: ICD-10-CM

## 2023-02-22 RX ORDER — LOSARTAN POTASSIUM 50 MG/1
TABLET ORAL
Qty: 30 TABLET | Refills: 0 | Status: SHIPPED | OUTPATIENT
Start: 2023-02-22

## 2023-03-23 ENCOUNTER — HOSPITAL ENCOUNTER (OUTPATIENT)
Dept: RADIOLOGY | Facility: HOSPITAL | Age: 63
End: 2023-03-23

## 2023-03-23 DIAGNOSIS — S63.612A SPRAIN OF RIGHT MIDDLE FINGER, UNSPECIFIED SITE OF DIGIT, INITIAL ENCOUNTER: ICD-10-CM

## 2023-03-23 DIAGNOSIS — S67.193A CRUSHING INJURY OF LEFT MIDDLE FINGER, INITIAL ENCOUNTER: ICD-10-CM

## 2023-03-29 DIAGNOSIS — I10 ESSENTIAL HYPERTENSION: ICD-10-CM

## 2023-03-29 DIAGNOSIS — E11.65 UNCONTROLLED TYPE 2 DIABETES MELLITUS WITH HYPERGLYCEMIA (HCC): ICD-10-CM

## 2023-03-29 RX ORDER — LOSARTAN POTASSIUM 50 MG/1
TABLET ORAL
Qty: 30 TABLET | Refills: 0 | Status: SHIPPED | OUTPATIENT
Start: 2023-03-29

## 2023-04-28 DIAGNOSIS — I10 ESSENTIAL HYPERTENSION: ICD-10-CM

## 2023-04-28 DIAGNOSIS — E11.65 UNCONTROLLED TYPE 2 DIABETES MELLITUS WITH HYPERGLYCEMIA (HCC): ICD-10-CM

## 2023-04-28 RX ORDER — LOSARTAN POTASSIUM 50 MG/1
TABLET ORAL
Qty: 30 TABLET | Refills: 0 | Status: SHIPPED | OUTPATIENT
Start: 2023-04-28

## 2023-05-04 ENCOUNTER — EVALUATION (OUTPATIENT)
Dept: OCCUPATIONAL THERAPY | Facility: CLINIC | Age: 63
End: 2023-05-04

## 2023-05-04 DIAGNOSIS — M20.031 SWAN-NECK DEFORMITY OF FINGER OF RIGHT HAND: Primary | ICD-10-CM

## 2023-05-04 NOTE — PROGRESS NOTES
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 Yes  Patient or Caregiver able to don/doff orthotic independently? Yes    Written orders provided to patient?  Yes  Orders Obtained: Written  Orders Obtained from:  Sol Rosado    Return for evaluation and treatment No

## 2023-05-06 ENCOUNTER — APPOINTMENT (OUTPATIENT)
Dept: LAB | Facility: HOSPITAL | Age: 63
End: 2023-05-06

## 2023-05-06 DIAGNOSIS — R35.1 NOCTURIA: ICD-10-CM

## 2023-05-06 DIAGNOSIS — E78.2 MIXED HYPERLIPIDEMIA: ICD-10-CM

## 2023-05-06 DIAGNOSIS — I10 ESSENTIAL HYPERTENSION: ICD-10-CM

## 2023-05-06 DIAGNOSIS — R73.9 HYPERGLYCEMIA: ICD-10-CM

## 2023-05-06 DIAGNOSIS — E11.65 UNCONTROLLED TYPE 2 DIABETES MELLITUS WITH HYPERGLYCEMIA (HCC): ICD-10-CM

## 2023-05-06 LAB
ALBUMIN SERPL BCP-MCNC: 4.2 G/DL (ref 3.5–5)
ALP SERPL-CCNC: 49 U/L (ref 34–104)
ALT SERPL W P-5'-P-CCNC: 32 U/L (ref 7–52)
ANION GAP SERPL CALCULATED.3IONS-SCNC: 10 MMOL/L (ref 4–13)
AST SERPL W P-5'-P-CCNC: 18 U/L (ref 13–39)
BASOPHILS # BLD AUTO: 0.04 THOUSANDS/ÂΜL (ref 0–0.1)
BASOPHILS NFR BLD AUTO: 1 % (ref 0–1)
BILIRUB SERPL-MCNC: 0.51 MG/DL (ref 0.2–1)
BUN SERPL-MCNC: 25 MG/DL (ref 5–25)
CALCIUM SERPL-MCNC: 9.6 MG/DL (ref 8.4–10.2)
CHLORIDE SERPL-SCNC: 101 MMOL/L (ref 96–108)
CHOLEST SERPL-MCNC: 235 MG/DL
CO2 SERPL-SCNC: 27 MMOL/L (ref 21–32)
CREAT SERPL-MCNC: 1.03 MG/DL (ref 0.6–1.3)
EOSINOPHIL # BLD AUTO: 0.13 THOUSAND/ÂΜL (ref 0–0.61)
EOSINOPHIL NFR BLD AUTO: 2 % (ref 0–6)
ERYTHROCYTE [DISTWIDTH] IN BLOOD BY AUTOMATED COUNT: 12.8 % (ref 11.6–15.1)
GFR SERPL CREATININE-BSD FRML MDRD: 78 ML/MIN/1.73SQ M
GLUCOSE P FAST SERPL-MCNC: 132 MG/DL (ref 65–99)
HCT VFR BLD AUTO: 48.8 % (ref 36.5–49.3)
HDLC SERPL-MCNC: 36 MG/DL
HGB BLD-MCNC: 16.4 G/DL (ref 12–17)
IMM GRANULOCYTES # BLD AUTO: 0.03 THOUSAND/UL (ref 0–0.2)
IMM GRANULOCYTES NFR BLD AUTO: 0 % (ref 0–2)
LDLC SERPL CALC-MCNC: 162 MG/DL (ref 0–100)
LYMPHOCYTES # BLD AUTO: 1.96 THOUSANDS/ÂΜL (ref 0.6–4.47)
LYMPHOCYTES NFR BLD AUTO: 26 % (ref 14–44)
MCH RBC QN AUTO: 30.7 PG (ref 26.8–34.3)
MCHC RBC AUTO-ENTMCNC: 33.6 G/DL (ref 31.4–37.4)
MCV RBC AUTO: 91 FL (ref 82–98)
MONOCYTES # BLD AUTO: 0.56 THOUSAND/ÂΜL (ref 0.17–1.22)
MONOCYTES NFR BLD AUTO: 7 % (ref 4–12)
NEUTROPHILS # BLD AUTO: 4.96 THOUSANDS/ÂΜL (ref 1.85–7.62)
NEUTS SEG NFR BLD AUTO: 64 % (ref 43–75)
NONHDLC SERPL-MCNC: 199 MG/DL
NRBC BLD AUTO-RTO: 0 /100 WBCS
PLATELET # BLD AUTO: 186 THOUSANDS/UL (ref 149–390)
PMV BLD AUTO: 10.4 FL (ref 8.9–12.7)
POTASSIUM SERPL-SCNC: 4.5 MMOL/L (ref 3.5–5.3)
PROT SERPL-MCNC: 7.2 G/DL (ref 6.4–8.4)
RBC # BLD AUTO: 5.35 MILLION/UL (ref 3.88–5.62)
SODIUM SERPL-SCNC: 138 MMOL/L (ref 135–147)
TRIGL SERPL-MCNC: 187 MG/DL
TSH SERPL DL<=0.05 MIU/L-ACNC: 2.41 UIU/ML (ref 0.45–4.5)
WBC # BLD AUTO: 7.68 THOUSAND/UL (ref 4.31–10.16)

## 2023-05-07 LAB
EST. AVERAGE GLUCOSE BLD GHB EST-MCNC: 157 MG/DL
HBA1C MFR BLD: 7.1 %

## 2023-05-09 ENCOUNTER — OFFICE VISIT (OUTPATIENT)
Dept: FAMILY MEDICINE CLINIC | Facility: CLINIC | Age: 63
End: 2023-05-09

## 2023-05-09 VITALS
BODY MASS INDEX: 40.74 KG/M2 | HEART RATE: 80 BPM | SYSTOLIC BLOOD PRESSURE: 158 MMHG | DIASTOLIC BLOOD PRESSURE: 80 MMHG | WEIGHT: 284.6 LBS | HEIGHT: 70 IN | RESPIRATION RATE: 18 BRPM | TEMPERATURE: 98.1 F | OXYGEN SATURATION: 98 %

## 2023-05-09 DIAGNOSIS — E78.2 MIXED HYPERLIPIDEMIA: Primary | ICD-10-CM

## 2023-05-09 DIAGNOSIS — R35.1 NOCTURIA: ICD-10-CM

## 2023-05-09 DIAGNOSIS — B35.6 TINEA CRURIS: ICD-10-CM

## 2023-05-09 DIAGNOSIS — I10 ESSENTIAL HYPERTENSION: ICD-10-CM

## 2023-05-09 DIAGNOSIS — R73.9 HYPERGLYCEMIA: ICD-10-CM

## 2023-05-09 DIAGNOSIS — E11.65 UNCONTROLLED TYPE 2 DIABETES MELLITUS WITH HYPERGLYCEMIA (HCC): ICD-10-CM

## 2023-05-09 LAB
PSA FREE MFR SERPL: 54 %
PSA FREE SERPL-MCNC: 0.27 NG/ML
PSA SERPL-MCNC: 0.5 NG/ML (ref 0–4)

## 2023-05-09 RX ORDER — LOSARTAN POTASSIUM 50 MG/1
50 TABLET ORAL DAILY
Qty: 90 TABLET | Refills: 1 | Status: SHIPPED | OUTPATIENT
Start: 2023-05-09

## 2023-05-09 RX ORDER — METFORMIN HYDROCHLORIDE 500 MG/1
500 TABLET, EXTENDED RELEASE ORAL 2 TIMES DAILY WITH MEALS
Qty: 60 TABLET | Refills: 2 | Status: SHIPPED | OUTPATIENT
Start: 2023-05-09

## 2023-05-09 NOTE — ASSESSMENT & PLAN NOTE
Lab Results   Component Value Date    HGBA1C 7 1 (H) 05/06/2023   Most recent A1c is up slightly at 7 1 he needs to work better with his diet and exercise as his weight went up since 6 months ago by 14 pounds he hopes to get this back off now with more activity throughout the summer and his wife is also working on weight reduction hopefully the 2 of them can work together to get a better health plan for the future as I will recheck all lab work in 6 months

## 2023-05-09 NOTE — ASSESSMENT & PLAN NOTE
Hypertension under stable control currently with losartan but he will need to work on weight reduction and avoidance of sodium and more exercise

## 2023-05-09 NOTE — ASSESSMENT & PLAN NOTE
Mixed hyperlipidemia is stable follow a heart healthy diet avoiding saturated fats add statin medication if numbers go up and he needs to work on more exercise and weight reduction

## 2023-05-09 NOTE — PROGRESS NOTES
BMI Counseling: Body mass index is 40 84 kg/m²  The BMI is above normal  Nutrition recommendations include reducing portion sizes, decreasing overall calorie intake, 3-5 servings of fruits/vegetables daily, reducing fast food intake, consuming healthier snacks, decreasing soda and/or juice intake, moderation in carbohydrate intake, increasing intake of lean protein, reducing intake of saturated fat and trans fat and reducing intake of cholesterol  Exercise recommendations include exercising 3-5 times per week

## 2023-05-09 NOTE — PROGRESS NOTES
Assessment/Plan:       Problem List Items Addressed This Visit        Endocrine    Uncontrolled type 2 diabetes mellitus with hyperglycemia (HCC)       Lab Results   Component Value Date    HGBA1C 7 1 (H) 05/06/2023   Most recent A1c is up slightly at 7 1 he needs to work better with his diet and exercise as his weight went up since 6 months ago by 14 pounds he hopes to get this back off now with more activity throughout the summer and his wife is also working on weight reduction hopefully the 2 of them can work together to get a better health plan for the future as I will recheck all lab work in 6 months         Relevant Medications    losartan (COZAAR) 50 mg tablet    metFORMIN (GLUCOPHAGE-XR) 500 mg 24 hr tablet    Other Relevant Orders    Microalbumin / creatinine urine ratio    Albumin / creatinine urine ratio    Comprehensive metabolic panel    Hemoglobin A1C    Lipid Panel with Direct LDL reflex       Cardiovascular and Mediastinum    Essential hypertension     Hypertension under stable control currently with losartan but he will need to work on weight reduction and avoidance of sodium and more exercise         Relevant Medications    losartan (COZAAR) 50 mg tablet    metFORMIN (GLUCOPHAGE-XR) 500 mg 24 hr tablet    Other Relevant Orders    Microalbumin / creatinine urine ratio       Musculoskeletal and Integument    Tinea cruris    Relevant Medications    metFORMIN (GLUCOPHAGE-XR) 500 mg 24 hr tablet    Other Relevant Orders    Microalbumin / creatinine urine ratio       Other    Hyperglycemia    Relevant Medications    metFORMIN (GLUCOPHAGE-XR) 500 mg 24 hr tablet    Other Relevant Orders    Microalbumin / creatinine urine ratio    Mixed hyperlipidemia - Primary     Mixed hyperlipidemia is stable follow a heart healthy diet avoiding saturated fats add statin medication if numbers go up and he needs to work on more exercise and weight reduction        Other Visit Diagnoses     Nocturia        Relevant Orders "   PSA, total and free            Subjective:      Patient ID: Mateo Marina is a 66 y o  male  Follow-up 6-month evaluation review of lab work and discuss diabetic treatment plan      The following portions of the patient's history were reviewed and updated as appropriate: allergies, current medications, past family history, past medical history, past social history, past surgical history and problem list     Review of Systems   Constitutional: Negative for chills, fatigue and fever  HENT: Negative for congestion, nosebleeds, rhinorrhea, sinus pressure and sore throat  Eyes: Negative for discharge and redness  Respiratory: Negative for cough and shortness of breath  Cardiovascular: Negative for chest pain, palpitations and leg swelling  Gastrointestinal: Negative for abdominal pain, blood in stool and nausea  Endocrine: Negative for cold intolerance, heat intolerance and polyuria  Genitourinary: Negative for dysuria and frequency  Musculoskeletal: Negative for arthralgias, back pain and myalgias  Skin: Negative for rash  Neurological: Negative for dizziness, weakness and headaches  Hematological: Negative for adenopathy  Psychiatric/Behavioral: Negative for behavioral problems and sleep disturbance  The patient is not nervous/anxious  Objective:      /80 (BP Location: Left arm, Patient Position: Sitting, Cuff Size: Standard)   Pulse 80   Temp 98 1 °F (36 7 °C) (Tympanic)   Resp 18   Ht 5' 10\" (1 778 m)   Wt 129 kg (284 lb 9 6 oz)   SpO2 98%   BMI 40 84 kg/m²        Physical Exam  Vitals and nursing note reviewed  Constitutional:       Appearance: Normal appearance  He is well-developed  He is obese  HENT:      Head: Normocephalic and atraumatic        Right Ear: Tympanic membrane, ear canal and external ear normal       Left Ear: Tympanic membrane, ear canal and external ear normal       Nose: Nose normal       Mouth/Throat:      Mouth: Mucous membranes are " moist       Pharynx: Oropharynx is clear  Eyes:      General: No scleral icterus  Extraocular Movements: Extraocular movements intact  Conjunctiva/sclera: Conjunctivae normal       Pupils: Pupils are equal, round, and reactive to light  Neck:      Thyroid: No thyromegaly  Vascular: No JVD  Cardiovascular:      Rate and Rhythm: Normal rate and regular rhythm  Pulses: Normal pulses  Heart sounds: Normal heart sounds  No murmur heard  Pulmonary:      Effort: Pulmonary effort is normal       Breath sounds: Normal breath sounds  No wheezing or rales  Chest:      Chest wall: No tenderness  Abdominal:      General: Bowel sounds are normal  There is no distension  Palpations: Abdomen is soft  There is no mass  Tenderness: There is no abdominal tenderness  There is no guarding or rebound  Musculoskeletal:         General: No tenderness or deformity  Normal range of motion  Cervical back: Normal range of motion and neck supple  Lymphadenopathy:      Cervical: No cervical adenopathy  Skin:     General: Skin is warm and dry  Capillary Refill: Capillary refill takes less than 2 seconds  Findings: No erythema or rash  Neurological:      General: No focal deficit present  Mental Status: He is alert and oriented to person, place, and time  Mental status is at baseline  Cranial Nerves: No cranial nerve deficit  Deep Tendon Reflexes: Reflexes are normal and symmetric  Reflexes normal    Psychiatric:         Mood and Affect: Mood normal          Behavior: Behavior normal          Thought Content: Thought content normal          Judgment: Judgment normal           Data:    Laboratory Results: I have personally reviewed the pertinent laboratory results/reports   Radiology/Other Diagnostic Testing Results: I have personally reviewed pertinent reports         Lab Results   Component Value Date    WBC 7 68 05/06/2023    HGB 16 4 05/06/2023    HCT 48 8 05/06/2023    MCV 91 05/06/2023     05/06/2023     Lab Results   Component Value Date     05/25/2018    K 4 5 05/06/2023     05/06/2023    CO2 27 05/06/2023    ANIONGAP 11 5 05/25/2018    BUN 25 05/06/2023    CREATININE 1 03 05/06/2023    GLUF 132 (H) 05/06/2023    CALCIUM 9 6 05/06/2023    AST 18 05/06/2023    ALT 32 05/06/2023    ALKPHOS 49 05/06/2023    PROT 7 0 05/25/2018    BILITOT 0 5 05/25/2018    EGFR 78 05/06/2023     Lab Results   Component Value Date    CHOLESTEROL 235 (H) 05/06/2023    CHOLESTEROL 222 (H) 11/12/2021    CHOLESTEROL 187 07/02/2021     Lab Results   Component Value Date    HDL 36 (L) 05/06/2023    HDL 41 11/12/2021    HDL 37 (L) 07/02/2021     Lab Results   Component Value Date    LDLCALC 162 (H) 05/06/2023    LDLCALC 164 (H) 11/12/2021    LDLCALC 135 (H) 07/02/2021     Lab Results   Component Value Date    TRIG 187 (H) 05/06/2023    TRIG 86 11/12/2021    TRIG 76 07/02/2021     No results found for: [place], [place]  Lab Results   Component Value Date    UTH1QLTTNPJG 2 413 05/06/2023     Lab Results   Component Value Date    HGBA1C 7 1 (H) 05/06/2023     Lab Results   Component Value Date    PSA 0 5 03/07/2021       Jesse Grajeda DO

## 2023-07-28 DIAGNOSIS — E11.65 UNCONTROLLED TYPE 2 DIABETES MELLITUS WITH HYPERGLYCEMIA (HCC): ICD-10-CM

## 2023-07-28 DIAGNOSIS — R73.9 HYPERGLYCEMIA: ICD-10-CM

## 2023-07-28 DIAGNOSIS — I10 ESSENTIAL HYPERTENSION: ICD-10-CM

## 2023-07-28 DIAGNOSIS — B35.6 TINEA CRURIS: ICD-10-CM

## 2023-07-28 RX ORDER — METFORMIN HYDROCHLORIDE 500 MG/1
500 TABLET, EXTENDED RELEASE ORAL 2 TIMES DAILY WITH MEALS
Qty: 60 TABLET | Refills: 2 | Status: SHIPPED | OUTPATIENT
Start: 2023-07-28

## 2023-11-01 DIAGNOSIS — I10 ESSENTIAL HYPERTENSION: ICD-10-CM

## 2023-11-01 DIAGNOSIS — E11.65 UNCONTROLLED TYPE 2 DIABETES MELLITUS WITH HYPERGLYCEMIA (HCC): ICD-10-CM

## 2023-11-01 DIAGNOSIS — B35.6 TINEA CRURIS: ICD-10-CM

## 2023-11-01 DIAGNOSIS — R73.9 HYPERGLYCEMIA: ICD-10-CM

## 2023-11-01 RX ORDER — LOSARTAN POTASSIUM 50 MG/1
50 TABLET ORAL DAILY
Qty: 90 TABLET | Refills: 1 | Status: SHIPPED | OUTPATIENT
Start: 2023-11-01

## 2023-11-01 RX ORDER — METFORMIN HYDROCHLORIDE 500 MG/1
500 TABLET, EXTENDED RELEASE ORAL 2 TIMES DAILY WITH MEALS
Qty: 60 TABLET | Refills: 2 | Status: SHIPPED | OUTPATIENT
Start: 2023-11-01

## 2023-11-20 ENCOUNTER — APPOINTMENT (OUTPATIENT)
Dept: LAB | Facility: HOSPITAL | Age: 63
End: 2023-11-20
Payer: COMMERCIAL

## 2023-11-20 DIAGNOSIS — R35.1 NOCTURIA: ICD-10-CM

## 2023-11-20 DIAGNOSIS — E11.65 UNCONTROLLED TYPE 2 DIABETES MELLITUS WITH HYPERGLYCEMIA (HCC): ICD-10-CM

## 2023-11-20 LAB
ALBUMIN SERPL BCP-MCNC: 4.3 G/DL (ref 3.5–5)
ALP SERPL-CCNC: 60 U/L (ref 34–104)
ALT SERPL W P-5'-P-CCNC: 35 U/L (ref 7–52)
ANION GAP SERPL CALCULATED.3IONS-SCNC: 10 MMOL/L
AST SERPL W P-5'-P-CCNC: 25 U/L (ref 13–39)
BILIRUB SERPL-MCNC: 0.44 MG/DL (ref 0.2–1)
BUN SERPL-MCNC: 24 MG/DL (ref 5–25)
CALCIUM SERPL-MCNC: 9.8 MG/DL (ref 8.4–10.2)
CHLORIDE SERPL-SCNC: 100 MMOL/L (ref 96–108)
CHOLEST SERPL-MCNC: 192 MG/DL
CO2 SERPL-SCNC: 29 MMOL/L (ref 21–32)
CREAT SERPL-MCNC: 1.12 MG/DL (ref 0.6–1.3)
EST. AVERAGE GLUCOSE BLD GHB EST-MCNC: 194 MG/DL
GFR SERPL CREATININE-BSD FRML MDRD: 70 ML/MIN/1.73SQ M
GLUCOSE P FAST SERPL-MCNC: 194 MG/DL (ref 65–99)
HBA1C MFR BLD: 8.4 %
HDLC SERPL-MCNC: 30 MG/DL
LDLC SERPL CALC-MCNC: 117 MG/DL (ref 0–100)
POTASSIUM SERPL-SCNC: 4.4 MMOL/L (ref 3.5–5.3)
PROT SERPL-MCNC: 7.4 G/DL (ref 6.4–8.4)
SODIUM SERPL-SCNC: 139 MMOL/L (ref 135–147)
TRIGL SERPL-MCNC: 227 MG/DL

## 2023-11-20 PROCEDURE — 36415 COLL VENOUS BLD VENIPUNCTURE: CPT

## 2023-11-20 PROCEDURE — 83036 HEMOGLOBIN GLYCOSYLATED A1C: CPT

## 2023-11-20 PROCEDURE — 80061 LIPID PANEL: CPT

## 2023-11-20 PROCEDURE — 84154 ASSAY OF PSA FREE: CPT

## 2023-11-20 PROCEDURE — 80053 COMPREHEN METABOLIC PANEL: CPT

## 2023-11-20 PROCEDURE — 84153 ASSAY OF PSA TOTAL: CPT

## 2023-11-21 ENCOUNTER — OFFICE VISIT (OUTPATIENT)
Dept: FAMILY MEDICINE CLINIC | Facility: CLINIC | Age: 63
End: 2023-11-21
Payer: COMMERCIAL

## 2023-11-21 VITALS
HEART RATE: 80 BPM | SYSTOLIC BLOOD PRESSURE: 160 MMHG | RESPIRATION RATE: 20 BRPM | WEIGHT: 279 LBS | TEMPERATURE: 98.4 F | OXYGEN SATURATION: 95 % | HEIGHT: 70 IN | DIASTOLIC BLOOD PRESSURE: 80 MMHG | BODY MASS INDEX: 39.94 KG/M2

## 2023-11-21 DIAGNOSIS — Z12.11 SCREENING FOR COLON CANCER: ICD-10-CM

## 2023-11-21 DIAGNOSIS — E78.2 MIXED HYPERLIPIDEMIA: ICD-10-CM

## 2023-11-21 DIAGNOSIS — N52.8 OTHER MALE ERECTILE DYSFUNCTION: ICD-10-CM

## 2023-11-21 DIAGNOSIS — E11.65 UNCONTROLLED TYPE 2 DIABETES MELLITUS WITH HYPERGLYCEMIA (HCC): Primary | ICD-10-CM

## 2023-11-21 DIAGNOSIS — I10 ESSENTIAL HYPERTENSION: ICD-10-CM

## 2023-11-21 LAB
PSA FREE MFR SERPL: 44 %
PSA FREE SERPL-MCNC: 0.22 NG/ML
PSA SERPL-MCNC: 0.5 NG/ML (ref 0–4)

## 2023-11-21 PROCEDURE — 99396 PREV VISIT EST AGE 40-64: CPT | Performed by: FAMILY MEDICINE

## 2023-11-21 RX ORDER — TADALAFIL 10 MG/1
10 TABLET ORAL DAILY PRN
Qty: 20 TABLET | Refills: 5 | Status: SHIPPED | OUTPATIENT
Start: 2023-11-21

## 2023-11-21 NOTE — ASSESSMENT & PLAN NOTE
With her diet and work on exercise and weight reduction follow-up lipid profile CMP at next office visit

## 2023-11-21 NOTE — ASSESSMENT & PLAN NOTE
Stress worse over past 6 months son with severe Depression and work stress. Eating more less active. Metformin caused diarrhea at 2 daily and reduced to 1 now.   Lab Results   Component Value Date    HGBA1C 8.4 (H) 11/20/2023

## 2023-11-21 NOTE — PROGRESS NOTES
ADULT ANNUAL [facility]    NAME: Rina Horvath  AGE: 66 y.o. SEX: male  : 1960     DATE: 2023     Assessment and Plan:     Problem List Items Addressed This Visit          Endocrine    Uncontrolled type 2 diabetes mellitus with hyperglycemia ([address]) - Primary     Stress worse over past 6 months son with severe Depression and work stress. Eating more less active. Metformin caused diarrhea at 2 daily and reduced to 1 now. Lab Results   Component Value Date    HGBA1C 8.4 (H) 2023          Relevant Medications    Empagliflozin (JARDIANCE) 10 MG TABS tablet    Other Relevant Orders    Albumin / creatinine urine ratio    Comprehensive metabolic panel    Hemoglobin A1C    TSH, 3rd generation with Free T4 reflex    Lipid Panel with Direct LDL reflex       Cardiovascular and Mediastinum    Essential hypertension     Stable and recommend diet and weight loss.          Relevant Medications    Empagliflozin (JARDIANCE) 10 MG TABS tablet    Other Relevant Orders    Albumin / creatinine urine ratio    Comprehensive metabolic panel    Hemoglobin A1C    TSH, 3rd generation with Free T4 reflex    Lipid Panel with Direct LDL reflex       Other    Mixed hyperlipidemia     With her diet and work on exercise and weight reduction follow-up lipid profile CMP at next office visit         Relevant Medications    Empagliflozin (JARDIANCE) 10 MG TABS tablet    Other Relevant Orders    Albumin / creatinine urine ratio    Comprehensive metabolic panel    Hemoglobin A1C    TSH, 3rd generation with Free T4 reflex    Lipid Panel with Direct LDL reflex    Other male erectile dysfunction     Cialis prescribed a 10 mg         Relevant Orders    Albumin / creatinine urine ratio    Comprehensive metabolic panel    Hemoglobin A1C    TSH, 3rd generation with Free T4 reflex    Lipid Panel with Direct LDL reflex     Other Visit Diagnoses       Screening for colon cancer        Relevant Orders    Ambulatory Referral to Gastroenterology            Immunizations and preventive care screenings were discussed with patient today. Appropriate education was printed on patient's after visit summary. Discussed risks and benefits of prostate cancer screening. We discussed the controversial history of PSA screening for prostate cancer in the [facility] as well as the risk of over detection and over treatment of prostate cancer by way of PSA screening. The patient understands that PSA blood testing is an imperfect way to screen for prostate cancer and that elevated PSA levels in the blood may also be caused by infection, inflammation, prostatic trauma or manipulation, urological procedures, or by benign prostatic enlargement. The role of the digital rectal examination in prostate cancer screening was also discussed and I discussed with him that there is large interobserver variability in the findings of digital rectal examination. Counseling:  Alcohol/drug use: discussed moderation in alcohol intake, the recommendations for healthy alcohol use, and avoidance of illicit drug use. Dental Health: discussed importance of regular tooth brushing, flossing, and dental visits. Injury prevention: discussed safety/seat belts, safety helmets, smoke detectors, carbon dioxide detectors, and smoking near bedding or upholstery. Sexual health: discussed sexually transmitted diseases, partner selection, use of condoms, avoidance of unintended pregnancy, and contraceptive alternatives. Exercise: the importance of regular exercise/physical activity was discussed. Recommend exercise 3-5 times per week for at least 30 minutes. Depression Screening and Follow-up Plan: Patient was screened for depression during today's encounter. They screened negative with a PHQ-2 score of 0. No follow-ups on file.      Chief Complaint:     Chief Complaint   Patient presents with Follow-up      History of Present Illness:     Adult Annual Physical   Patient here for a comprehensive physical exam. The patient reports no problems. Diet and Physical Activity  Diet/Nutrition: well balanced diet and diabetic diet. Exercise: no formal exercise. Depression Screening  PHQ-2/9 Depression Screening    Little interest or pleasure in doing things: 0 - not at all  Feeling down, depressed, or hopeless: 0 - not at all  PHQ-2 Score: 0  PHQ-2 Interpretation: Negative depression screen       General Health  Sleep: sleeps well. Hearing: normal - bilateral.  Vision: no vision problems and wears glasses. Dental: regular dental visits.  Health  Symptoms include: none    Advanced Care Planning  Do you have an advanced directive? no  Do you have a durable medical power of ? no     Review of Systems:     Review of Systems   Constitutional:  Negative for chills, fatigue and fever. HENT:  Negative for congestion, nosebleeds, rhinorrhea, sinus pressure and sore throat. Eyes:  Negative for discharge and redness. Respiratory:  Negative for cough and shortness of breath. Cardiovascular:  Negative for chest pain, palpitations and leg swelling. Gastrointestinal:  Positive for abdominal distention, abdominal pain and diarrhea. Negative for blood in stool and nausea. Endocrine: Negative for cold intolerance, heat intolerance and polyuria. Genitourinary:  Negative for dysuria and frequency. Musculoskeletal:  Negative for arthralgias, back pain and myalgias. Skin:  Negative for rash. Neurological:  Negative for dizziness, weakness and headaches. Hematological:  Negative for adenopathy. Psychiatric/Behavioral:  Negative for behavioral problems and sleep disturbance. The patient is not nervous/anxious.        Past Medical History:     Past Medical History:   Diagnosis Date    Hypertension       Past Surgical History:     Past Surgical History:   Procedure Laterality Date COLONOSCOPY  2017    GALLBLADDER SURGERY        Family History:     Family History   Problem Relation Age of Onset    Kidney failure Mother     Hypertension Mother     Colon cancer Father     Cancer Sister         bladder    Prostate cancer Brother     Kidney failure Brother     Bleeding Disorder Brother         leading factor 5    Kidney failure Brother       Social History:     Social History     Socioeconomic History    Marital status: /Civil Union     Spouse name: None    Number of children: None    Years of education: None    Highest education level: None   Occupational History    None   Tobacco Use    Smoking status: Never    Smokeless tobacco: Never   Vaping Use    Vaping Use: Never used   Substance and Sexual Activity    Alcohol use: Not Currently     Comment: rarely    Drug use: Never    Sexual activity: None   Other Topics Concern    None   Social History Narrative    Daily Caffeine Use- 2 cup of coffee     Social Determinants of Health     Financial Resource Strain: Not on file   Food Insecurity: Not on file   Transportation Needs: Not on file   Physical Activity: Not on file   Stress: Not on file   Social Connections: Not on file   Intimate Partner Violence: Not on file   Housing Stability: Not on file      Current Medications:     Current Outpatient Medications   Medication Sig Dispense Refill    Blood Glucose Monitoring Suppl (Contour Next Monitor) w/Device KIT Use 1 Device 2 (two) times a day 1 kit 3    Continuous Blood Gluc  (FreeStyle Zuhair 14 Day Terrell) AKHIL Use 1 applicator every 14 (fourteen) days 1 Device 26    Continuous Blood Gluc Sensor (FreeStyle Zuhair 14 Day Sensor) MISC use 1 APPLICATOR four times a day 2 each 1    Empagliflozin (JARDIANCE) 10 MG TABS tablet Take 1 tablet (10 mg total) by mouth daily 30 tablet 5    glucose blood (Contour Next Test) test strip Use as instructed 200 each 2    Lancets (accu-chek soft touch) lancets Use as instructed 200 each 2    losartan (COZAAR) 50 mg tablet take 1 tablet by mouth once daily 90 tablet 1    metFORMIN (GLUCOPHAGE-XR) 500 mg 24 hr tablet take 1 tablet by mouth twice a day with meals 60 tablet 2     No current facility-administered medications for this visit. Allergies: Allergies   Allergen Reactions    Biaxin [Clarithromycin] Hives    Ceclor [Cefaclor] Hives      Physical Exam:     /80 (BP Location: Left arm, Patient Position: Sitting, Cuff Size: Standard)   Pulse 80   Temp 98.4 °F (36.9 °C) (Tympanic)   Resp 20   Ht 5' 10" (1.778 m)   Wt 127 kg (279 lb)   SpO2 95%   BMI 40.03 kg/m²     Physical Exam  Vitals and nursing note reviewed. Constitutional:       General: He is not in acute distress. Appearance: Normal appearance. He is well-developed. He is obese. HENT:      Head: Normocephalic and atraumatic. Right Ear: Tympanic membrane, ear canal and external ear normal.      Left Ear: Tympanic membrane, ear canal and external ear normal.      Nose: Nose normal.      Mouth/Throat:      Mouth: Mucous membranes are moist.      Pharynx: Oropharynx is clear. Eyes:      Extraocular Movements: Extraocular movements intact. Conjunctiva/sclera: Conjunctivae normal.      Pupils: Pupils are equal, round, and reactive to light. Cardiovascular:      Rate and Rhythm: Normal rate and regular rhythm. Pulses: Normal pulses. no weak pulses          Dorsalis pedis pulses are 2+ on the right side and 2+ on the left side. Posterior tibial pulses are 2+ on the right side and 2+ on the left side. Heart sounds: Normal heart sounds. No murmur heard. Pulmonary:      Effort: Pulmonary effort is normal. No respiratory distress. Breath sounds: Normal breath sounds. Abdominal:      General: Bowel sounds are normal.      Palpations: Abdomen is soft. Tenderness: There is no abdominal tenderness. Musculoskeletal:         General: No swelling. Normal range of motion.       Cervical back: Neck supple. Feet:      Right foot:      Skin integrity: No ulcer, skin breakdown, erythema, warmth, callus or dry skin. Left foot:      Skin integrity: No ulcer, skin breakdown, erythema, warmth, callus or dry skin. Skin:     General: Skin is warm and dry. Capillary Refill: Capillary refill takes less than 2 seconds. Neurological:      General: No focal deficit present. Mental Status: He is alert and oriented to person, place, and time. Mental status is at baseline. Psychiatric:         Mood and Affect: Mood normal.         Behavior: Behavior normal.         Thought Content: Thought content normal.         Judgment: Judgment normal.        BMI Counseling: Body mass index is 40.03 kg/m². The BMI is above normal. Nutrition recommendations include reducing portion sizes, decreasing overall calorie intake, 3-5 servings of fruits/vegetables daily, consuming healthier snacks, decreasing soda and/or juice intake, increasing intake of lean protein, and reducing intake of saturated fat and trans fat. Exercise recommendations include exercising 3-5 times per week. Diabetic Foot Exam    Patient's shoes and socks removed. Right Foot/Ankle   Right Foot Inspection  Skin Exam: skin normal and skin intact. No dry skin, no warmth, no callus, no erythema, no maceration, no abnormal color, no pre-ulcer, no ulcer and no callus. Toe Exam: ROM and strength within normal limits. Sensory   Monofilament testing: intact    Vascular  Capillary refills: < 3 seconds  The right DP pulse is 2+. The right PT pulse is 2+. Left Foot/Ankle  Left Foot Inspection  Skin Exam: skin normal and skin intact. No dry skin, no warmth, no erythema, no maceration, normal color, no pre-ulcer, no ulcer and no callus. Toe Exam: ROM and strength within normal limits. Sensory   Monofilament testing: intact    Vascular  Capillary refills: < 3 seconds  The left DP pulse is 2+. The left PT pulse is 2+.      Assign Risk Category  No deformity present  No loss of protective sensation  No weak pulses  Risk: 0    DO GAEL Sparks [facility] [address]

## 2023-11-21 NOTE — ADDENDUM NOTE
Discharge Summary  Neonatology    Boy Jose Segal is a 12 days male       MRN: [id number]      Admit Date: 2018    Birth Wt: 3441 g (7 lb 9.4 oz)    Birth Gestational Age: 37w0d    Discharge Date and Time: 2018    Discharge corrected GA: 38w 5d    Discharge Attending Physician: Stas Vasquez MD     Prenatal History:    labor     Delivery Information:  Infant delivered on 2018 at 8:11 AM by , Low Transverse. Apgars were 1Min.: 9, 5 Min.: 9, 10 Min.: . Amniotic fluid   Intervention/Resuscitation: .    Problem list:  Active Hospital Problems    Diagnosis  POA    Cardiac murmur [R01.1]  Unknown     Hx murmur. 1/10 ECHO: PDA left to right shunt and sma PFO w/left to right shunt  ECHO repeat due to desaturations during sleep and unable to wean off NC: Trivial PFO with left to right shunt; No PDA., Infant's clinical presentation c/w PPHN.      Term birth of  male [Z37.0]  Not Applicable     Repeat C/Section at 37 weeks due to maternal cholestasis. , lactation and dietary consulted. NICU admission for respiratory distress.   Mother requests to pump breasts only while baby is in NICU.  Pumping initiated 2018.  Sent home with MEI pump    DC:  1/10/18 PKU done with results pending.   18 ABR - passed   CPR -   18 Car seat passed   Circumcision per Dr. Bellamy  HC/Length @ dc -   Ped Appt with Dr Vasquez -    2D echo 1/10 small pda, pfo,l to rt shunt    Return MEI pump -          Resolved Hospital Problems    Diagnosis Date Resolved POA    *Respiratory distress [R06.03] 2018 Unknown     Infant with copious secretions at birth; suctioned, dried and stimulated with fair respiratory effort; required BM CPAP in delivery; unable to withdrawn oxygen w/o O2 saturations decreasing. Transferred to NICU and placed on HFNC at 5 lpm and 40%. Admit CBG 7.14/63/66/22-8. Saline bolus given, CPT and suctioning. CXR with perihilar streakiness.  Follow  Addended by: Vicky Kennedy on: 11/21/2023 06:35 PM     Modules accepted: Orders up CBG unchanged at 7.18/60/54/22/-7. Infant with worsening distress with grunting, retracting and periods of severe tachypnea; intubated and placed on SIMV: rate 40 PIP18 PEEP +4 and 40%. Curosurf administered and umbilical lines placed. Post intubation ABG 7.27/41/83/19/-7. 1/10 CXR with streaking and increased haziness but well expanded and umbilical lines in good place after earlier manipulation..  Extubated to HFNC % LPM 50% and quickly weaned to 3 LPM 30%.  Currently on HFNC 1Lpm 0.28%, intermittent tachypnea.   Attempted to wean to room air this morning but after 40 minutes sustained sats mid 80's on room air and tachypnea noted. Placed on NC 1LPN 25-30%. Continue to note desaturation throughout day when prongs are not in nose. 1/15 placed on RA .  Placed back on NC on  due to persistent sat mid 80's. Performed HUS to evaluate wether IVH could be cause of distress; HUS normal. CBC have normalized and Electrolytes wnl. Infant weaned off NC on 1918 at noon and  Remained stable in room air at time of discharge with acceptable saturations. Infants clincal course consistent with PPHN. Stable in room air since 1/15.        Hyperbilirubinemia,  [P59.9] 2018 Unknown     MBT: A+/BBT A+/Juanita neg.  Bili 13.9/0.4 at ~68 hours of life. S/P Phototherapy -..  Bili 11/0.5  Bili 12.4/0.4.  bili 10.7       infant [P07.30] 2018 Unknown    Need for observation and evaluation of  for sepsis [Z05.1] 2018 Not Applicable     Respiratory distress; maternal GBS unknown, Scheduled C/section. CBC on admit wnl; no left shift. S/P 4 days Ampicillin and Gentamicin. 1/10 Gent Peak 9.0.  Gent Tr 0.8.  Blood Cx  Negative at final.        Hypoglycemia,  [P70.4] 2018 Unknown     No history of maternal Gestational diabetes; infant' clinical appearance c/w IDM. Initial glucose 28. D10 bolus given and continuous D10 infusion started.  Follow up glucose levels normalized at 99, 65.  Continues to be stable on IV fluids. BMP glucose 79.  Chemstrip 86-91.  Chemstrip 67-88; stable off IV fluids.        Metabolic acidosis [E87.2] 2018 Unknown     Metabolic acidosis with BE -8. NS bolus given. Metabolic acidosis persists. Na Acetate bolus ordered given 1/10. Changed UAC and 2nd UVC port fluids to Na Acetate with hep at 0.5 ml/hr; added acetate in TPN. resulting BE -6.  BE now -2 and Lab CO2 26 with added acetate in IV fluids.  BE +3 and Lab CO2 28 with added acetate in IV fluids.  Metabolic acidosis resolved off IV fluids.            Feeding Method:    Ad wisam feedings being tolerated. Baby is stooling and voiding well.    Infant's Labs:  Recent Results (from the past 168 hour(s))    Bilirubin, Direct    Collection Time: 01/15/18  4:30 AM   Result Value Ref Range    Bilirubin, Direct - 0.5 0.1 - 0.6 mg/dL   Bilirubin, Total,     Collection Time: 01/15/18  4:30 AM   Result Value Ref Range    Bilirubin, Total -  11.5 (H) 0.1 - 10.0 mg/dL   ISTAT PROCEDURE    Collection Time: 01/15/18  4:30 AM   Result Value Ref Range    POC PH 7.403 7.35 - 7.45    POC PCO2 46.9 (H) 35 - 45 mmHg    POC PO2 35 (LL) 50 - 70 mmHg    POC HCO3 29.2 (H) 24 - 28 mmol/L    POC BE 4 -2 to 2 mmol/L    POC SATURATED O2 66 (L) 95 - 100 %    POC TCO2 31 (H) 23 - 27 mmol/L    Sample CAPILLARY     Site Other     Allens Test N/A     DelSys Nasal Can     Mode SPONT     Flow 1     FiO2 0.30     Sp02 97    CBC auto differential    Collection Time: 18 10:00 AM   Result Value Ref Range    WBC 10.62 5.00 - 34.00 K/uL    RBC 4.48 3.90 - 6.30 M/uL    Hemoglobin 15.4 13.5 - 19.5 g/dL    Hematocrit 43.7 42.0 - 63.0 %    MCV 98 88 - 118 fL    MCH 34.4 31.0 - 37.0 pg    MCHC 35.2 28.0 - 38.0 g/dL    RDW 15.6 (H) 11.5 - 14.5 %    Platelets SEE COMMENT 150 - 350 K/uL    MPV SEE COMMENT 9.2 - 12.9 fL    Lymph # CANCELED 2.0 - 17.0 K/uL     Mono # CANCELED 0.2 - 2.2 K/uL    Eos # CANCELED 0.0 - 0.8 K/uL    Baso # CANCELED 0.02 - 0.10 K/uL    Gran% 31.0 30.0 - 82.0 %    Lymph% 48.0 40.0 - 50.0 %    Mono% 12.0 0.8 - 18.7 %    Eosinophil% 4.0 0.0 - 7.5 %    Basophil% 0.0 (L) 0.1 - 0.8 %    Bands 5.0 %    Differential Method Manual    Comprehensive metabolic panel    Collection Time: 01/16/18 10:00 AM   Result Value Ref Range    Sodium 137 136 - 145 mmol/L    Potassium 4.9 3.5 - 5.1 mmol/L    Chloride 105 95 - 110 mmol/L    CO2 24 23 - 29 mmol/L    Glucose 82 70 - 110 mg/dL    BUN, Bld 9 5 - 18 mg/dL    Creatinine 0.6 0.5 - 1.4 mg/dL    Calcium 10.6 8.5 - 10.6 mg/dL    Total Protein 5.8 5.4 - 7.4 g/dL    Albumin 3.1 2.8 - 4.6 g/dL    Total Bilirubin 10.7 (H) 0.1 - 10.0 mg/dL    Alkaline Phosphatase 274 75 - 316 U/L    AST 25 10 - 40 U/L    ALT 13 10 - 44 U/L    Anion Gap 8 8 - 16 mmol/L    eGFR if  SEE COMMENT >60 mL/min/1.73 m^2    eGFR if non  SEE COMMENT >60 mL/min/1.73 m^2   C-reactive protein    Collection Time: 01/16/18 10:00 AM   Result Value Ref Range    CRP 1.8 0.0 - 8.2 mg/L   ISTAT PROCEDURE    Collection Time: 01/16/18 10:12 AM   Result Value Ref Range    POC PH 7.455 (H) 7.35 - 7.45    POC PCO2 37.9 35 - 45 mmHg    POC PO2 51 50 - 70 mmHg    POC HCO3 26.7 24 - 28 mmol/L    POC BE 3 -2 to 2 mmol/L    POC SATURATED O2 88 (L) 95 - 100 %    POC TCO2 28 (H) 23 - 27 mmol/L    Sample CAPILLARY     Site Other     Allens Test N/A     DelSys Room Air     Mode SPONT     FiO2 21    ISTAT PROCEDURE    Collection Time: 01/17/18 10:39 AM   Result Value Ref Range    POC PH 7.471 (H) 7.35 - 7.45    POC PCO2 40.8 35 - 45 mmHg    POC PO2 52 50 - 70 mmHg    POC HCO3 29.8 (H) 24 - 28 mmol/L    POC BE 6 -2 to 2 mmol/L    POC SATURATED O2 88 (L) 95 - 100 %    POC TCO2 31 (H) 23 - 27 mmol/L    Sample CAPILLARY     Site Other     Allens Test N/A     DelSys Nasal Can     Mode SPONT     Flow 2     FiO2 25     Sp02 95        · Hearing  "Screen Right Ear:passed    Left Ear:  passed                 · Surgical Procedures: {SURGICAL PROCEDURES:33159::"none"      Discharge Exam: Done on day of discharge.    Vitals:    01/21/18 0730   BP: 92/42   Pulse: 148   Resp: 46   Temp: 98 °F (36.7 °C)       Anthropometric measurements:   Head Circumference: 35 cm  Weight: 3308 g (7 lb 4.7 oz)  Height: 50 cm (19.69")    Physical Exam: on day of discharge.    General: active and reactive for age, non-dysmorphic  Head: normocephalic, anterior fontanel is open, soft and flat  Eyes: lids open, eyes clear without drainage and red reflex is present  Ears: normally set  Nose: nares patent  Oropharynx: palate: intact and moist mucus membranes  Neck: no deformities, clavicles intact  Chest: clear and equal breath sounds bilaterally, no retractions, chest rise symmetrical  Heart: quiet precordium, regular rate and rhythm, normal S1 and S2, no murmur, femoral pulses equal, brisk capillary refill  Abdomen: soft, non-tender, non-distended, no hepatosplenomegaly, no masses and bowel sounds present  Genitourinary: normal genitalia  Musculoskeletal/Extremities: moves all extremities, no deformities  Back: spine intact, no migel, lesions, or dimples  Hips: no clicks or clunks  Neurologic: active and responsive, spontaneous activity, appropriate tone for gestational age, normal suck, gag Present  Skin: Condition:  Warm, Color: pink  Anus: present - normally placed      PLAN:     Discharge Date/Time: 2018     Immunization:  Immunization History   Administered Date(s) Administered    Hepatitis B, Pediatric/Adolescent 2018         Patient Instructions and Medications:  Refer to Discharge Medication/Medcard.    Special Instructions: given by discharge team.    Discharged Condition: good    Disposition: Home with mother  "

## 2024-03-26 ENCOUNTER — OFFICE VISIT (OUTPATIENT)
Dept: FAMILY MEDICINE CLINIC | Facility: CLINIC | Age: 64
End: 2024-03-26
Payer: COMMERCIAL

## 2024-03-26 VITALS
DIASTOLIC BLOOD PRESSURE: 86 MMHG | SYSTOLIC BLOOD PRESSURE: 148 MMHG | HEIGHT: 70 IN | RESPIRATION RATE: 18 BRPM | WEIGHT: 273.2 LBS | TEMPERATURE: 98.2 F | BODY MASS INDEX: 39.11 KG/M2 | HEART RATE: 76 BPM | OXYGEN SATURATION: 96 %

## 2024-03-26 DIAGNOSIS — Z12.11 SCREENING FOR COLON CANCER: ICD-10-CM

## 2024-03-26 DIAGNOSIS — I10 ESSENTIAL HYPERTENSION: ICD-10-CM

## 2024-03-26 DIAGNOSIS — E11.65 UNCONTROLLED TYPE 2 DIABETES MELLITUS WITH HYPERGLYCEMIA (HCC): ICD-10-CM

## 2024-03-26 DIAGNOSIS — N52.8 OTHER MALE ERECTILE DYSFUNCTION: ICD-10-CM

## 2024-03-26 DIAGNOSIS — E78.2 MIXED HYPERLIPIDEMIA: ICD-10-CM

## 2024-03-26 PROCEDURE — 99214 OFFICE O/P EST MOD 30 MIN: CPT | Performed by: FAMILY MEDICINE

## 2024-03-26 RX ORDER — ATORVASTATIN CALCIUM 10 MG/1
10 TABLET, FILM COATED ORAL DAILY
Qty: 90 TABLET | Refills: 3 | Status: SHIPPED | OUTPATIENT
Start: 2024-03-26

## 2024-03-26 RX ORDER — TADALAFIL 10 MG/1
10 TABLET ORAL DAILY PRN
Qty: 20 TABLET | Refills: 5 | Status: SHIPPED | OUTPATIENT
Start: 2024-03-26

## 2024-03-26 RX ORDER — FLASH GLUCOSE SENSOR
1 KIT MISCELLANEOUS
Qty: 2 EACH | Refills: 12 | Status: SHIPPED | OUTPATIENT
Start: 2024-03-26

## 2024-03-26 NOTE — PROGRESS NOTES
Assessment/Plan:       Problem List Items Addressed This Visit          Cardiovascular and Mediastinum    Essential hypertension     Stable on Losartan work on weight reduction and exercise         Relevant Medications    tadalafil (CIALIS) 10 MG tablet       Endocrine    Uncontrolled type 2 diabetes mellitus with hyperglycemia (HCC)       Lab Results   Component Value Date    HGBA1C 8.4 (H) 11/20/2023   Continue Metformin, Jardiance, and watch diet and he is considering a program at work but declining now. Resume 24 hr monitor.         Relevant Medications    tadalafil (CIALIS) 10 MG tablet    atorvastatin (LIPITOR) 10 mg tablet    Continuous Blood Gluc Sensor (FreeStyle Zuhair 14 Day Sensor) MISC    Other Relevant Orders    Albumin / creatinine urine ratio    Comprehensive metabolic panel    Hemoglobin A1C    Lipid Panel with Direct LDL reflex    TSH, 3rd generation with Free T4 reflex       Other    Mixed hyperlipidemia     Start lipitor now         Relevant Medications    tadalafil (CIALIS) 10 MG tablet    atorvastatin (LIPITOR) 10 mg tablet    Other male erectile dysfunction    Relevant Medications    tadalafil (CIALIS) 10 MG tablet     Other Visit Diagnoses       Screening for colon cancer        Relevant Medications    tadalafil (CIALIS) 10 MG tablet              Subjective:      Patient ID: Jonathan Hyde is a 64 y.o. male.    Back pain after moving his daughter, and med review.        The following portions of the patient's history were reviewed and updated as appropriate: allergies, current medications, past family history, past medical history, past social history, past surgical history and problem list.    Review of Systems   Constitutional:  Negative for chills, fatigue and fever.   HENT:  Negative for congestion, nosebleeds, rhinorrhea, sinus pressure and sore throat.    Eyes:  Negative for discharge and redness.   Respiratory:  Negative for cough and shortness of breath.    Cardiovascular:  Negative  "for chest pain, palpitations and leg swelling.   Gastrointestinal:  Negative for abdominal pain, blood in stool and nausea.   Endocrine: Negative for cold intolerance, heat intolerance and polyuria.   Genitourinary:  Negative for dysuria and frequency.   Musculoskeletal:  Positive for back pain. Negative for arthralgias and myalgias.   Skin:  Negative for rash.   Neurological:  Negative for dizziness, weakness and headaches.   Hematological:  Negative for adenopathy.   Psychiatric/Behavioral:  Negative for behavioral problems and sleep disturbance. The patient is not nervous/anxious.          Objective:      /86 (BP Location: Left arm, Patient Position: Sitting, Cuff Size: Large)   Pulse 76   Temp 98.2 °F (36.8 °C) (Tympanic)   Resp 18   Ht 5' 10\" (1.778 m)   Wt 124 kg (273 lb 3.2 oz)   SpO2 96%   BMI 39.20 kg/m²        Physical Exam  Vitals and nursing note reviewed.   Constitutional:       Appearance: Normal appearance. He is well-developed. He is obese.   HENT:      Head: Normocephalic and atraumatic.      Right Ear: Tympanic membrane and external ear normal.      Left Ear: Tympanic membrane and external ear normal.      Nose: Nose normal.      Mouth/Throat:      Mouth: Mucous membranes are moist.      Pharynx: Oropharynx is clear.   Eyes:      General: No scleral icterus.     Conjunctiva/sclera: Conjunctivae normal.      Pupils: Pupils are equal, round, and reactive to light.   Neck:      Thyroid: No thyromegaly.      Vascular: No JVD.   Cardiovascular:      Rate and Rhythm: Normal rate and regular rhythm.      Heart sounds: Normal heart sounds. No murmur heard.  Pulmonary:      Effort: Pulmonary effort is normal.      Breath sounds: Normal breath sounds. No wheezing or rales.   Chest:      Chest wall: No tenderness.   Abdominal:      General: Bowel sounds are normal. There is no distension.      Palpations: Abdomen is soft. There is no mass.      Tenderness: There is no abdominal tenderness. There " is no guarding or rebound.   Musculoskeletal:         General: No tenderness or deformity. Normal range of motion.      Cervical back: Normal range of motion and neck supple.   Lymphadenopathy:      Cervical: No cervical adenopathy.   Skin:     General: Skin is warm and dry.      Findings: No erythema or rash.   Neurological:      General: No focal deficit present.      Mental Status: He is alert and oriented to person, place, and time.      Cranial Nerves: No cranial nerve deficit.      Deep Tendon Reflexes: Reflexes are normal and symmetric. Reflexes normal.   Psychiatric:         Mood and Affect: Mood normal.         Behavior: Behavior normal.         Thought Content: Thought content normal.         Judgment: Judgment normal.          Data:    Laboratory Results: I have personally reviewed the pertinent laboratory results/reports   Radiology/Other Diagnostic Testing Results: I have personally reviewed pertinent reports.       Lab Results   Component Value Date    WBC 7.68 05/06/2023    HGB 16.4 05/06/2023    HCT 48.8 05/06/2023    MCV 91 05/06/2023     05/06/2023     Lab Results   Component Value Date     05/25/2018    K 4.4 11/20/2023     11/20/2023    CO2 29 11/20/2023    ANIONGAP 11.5 05/25/2018    BUN 24 11/20/2023    CREATININE 1.12 11/20/2023    GLUF 194 (H) 11/20/2023    CALCIUM 9.8 11/20/2023    AST 25 11/20/2023    ALT 35 11/20/2023    ALKPHOS 60 11/20/2023    PROT 7.0 05/25/2018    BILITOT 0.5 05/25/2018    EGFR 70 11/20/2023     Lab Results   Component Value Date    CHOLESTEROL 192 11/20/2023    CHOLESTEROL 235 (H) 05/06/2023    CHOLESTEROL 222 (H) 11/12/2021     Lab Results   Component Value Date    HDL 30 (L) 11/20/2023    HDL 36 (L) 05/06/2023    HDL 41 11/12/2021     Lab Results   Component Value Date    LDLCALC 117 (H) 11/20/2023    LDLCALC 162 (H) 05/06/2023    LDLCALC 164 (H) 11/12/2021     Lab Results   Component Value Date    TRIG 227 (H) 11/20/2023    TRIG 187 (H)  "05/06/2023    TRIG 86 11/12/2021     No results found for: \"CHOLHDL\"  Lab Results   Component Value Date    YJP0COARZOQP 2.413 05/06/2023     Lab Results   Component Value Date    HGBA1C 8.4 (H) 11/20/2023     Lab Results   Component Value Date    PSA 0.5 11/20/2023       Kurtis Singleton, DO      "

## 2024-03-26 NOTE — ASSESSMENT & PLAN NOTE
Lab Results   Component Value Date    HGBA1C 8.4 (H) 11/20/2023   Continue Metformin, Jardiance, and watch diet and he is considering a program at work but declining now. Resume 24 hr monitor.

## 2024-05-02 DIAGNOSIS — I10 ESSENTIAL HYPERTENSION: ICD-10-CM

## 2024-05-02 DIAGNOSIS — E11.65 UNCONTROLLED TYPE 2 DIABETES MELLITUS WITH HYPERGLYCEMIA (HCC): ICD-10-CM

## 2024-05-04 RX ORDER — LOSARTAN POTASSIUM 50 MG/1
50 TABLET ORAL DAILY
Qty: 90 TABLET | Refills: 1 | Status: SHIPPED | OUTPATIENT
Start: 2024-05-04

## 2024-05-08 DIAGNOSIS — I10 ESSENTIAL HYPERTENSION: ICD-10-CM

## 2024-05-08 DIAGNOSIS — E11.65 UNCONTROLLED TYPE 2 DIABETES MELLITUS WITH HYPERGLYCEMIA (HCC): ICD-10-CM

## 2024-05-08 DIAGNOSIS — E78.2 MIXED HYPERLIPIDEMIA: ICD-10-CM

## 2024-05-08 RX ORDER — EMPAGLIFLOZIN 10 MG/1
10 TABLET, FILM COATED ORAL DAILY
Qty: 30 TABLET | Refills: 5 | Status: SHIPPED | OUTPATIENT
Start: 2024-05-08

## 2024-07-18 ENCOUNTER — OFFICE VISIT (OUTPATIENT)
Dept: FAMILY MEDICINE CLINIC | Facility: CLINIC | Age: 64
End: 2024-07-18
Payer: COMMERCIAL

## 2024-07-18 VITALS
DIASTOLIC BLOOD PRESSURE: 80 MMHG | OXYGEN SATURATION: 95 % | BODY MASS INDEX: 38.45 KG/M2 | SYSTOLIC BLOOD PRESSURE: 122 MMHG | HEIGHT: 70 IN | RESPIRATION RATE: 18 BRPM | WEIGHT: 268.6 LBS | HEART RATE: 77 BPM | TEMPERATURE: 97.7 F

## 2024-07-18 DIAGNOSIS — B35.6 TINEA CRURIS: ICD-10-CM

## 2024-07-18 DIAGNOSIS — I10 ESSENTIAL HYPERTENSION: ICD-10-CM

## 2024-07-18 DIAGNOSIS — E11.65 UNCONTROLLED TYPE 2 DIABETES MELLITUS WITH HYPERGLYCEMIA (HCC): ICD-10-CM

## 2024-07-18 DIAGNOSIS — N52.8 OTHER MALE ERECTILE DYSFUNCTION: ICD-10-CM

## 2024-07-18 DIAGNOSIS — E78.2 MIXED HYPERLIPIDEMIA: ICD-10-CM

## 2024-07-18 DIAGNOSIS — R73.9 HYPERGLYCEMIA: ICD-10-CM

## 2024-07-18 DIAGNOSIS — Z12.11 SCREENING FOR COLON CANCER: ICD-10-CM

## 2024-07-18 PROCEDURE — 99214 OFFICE O/P EST MOD 30 MIN: CPT | Performed by: FAMILY MEDICINE

## 2024-07-18 RX ORDER — TADALAFIL 10 MG/1
10 TABLET ORAL DAILY PRN
Qty: 20 TABLET | Refills: 5 | Status: SHIPPED | OUTPATIENT
Start: 2024-07-18

## 2024-07-18 RX ORDER — METFORMIN HYDROCHLORIDE 500 MG/1
500 TABLET, EXTENDED RELEASE ORAL 2 TIMES DAILY WITH MEALS
Qty: 60 TABLET | Refills: 2 | Status: SHIPPED | OUTPATIENT
Start: 2024-07-18

## 2024-07-18 RX ORDER — ATORVASTATIN CALCIUM 10 MG/1
10 TABLET, FILM COATED ORAL DAILY
Qty: 90 TABLET | Refills: 3 | Status: SHIPPED | OUTPATIENT
Start: 2024-07-18

## 2024-07-18 RX ORDER — LOSARTAN POTASSIUM 50 MG/1
50 TABLET ORAL DAILY
Qty: 90 TABLET | Refills: 1 | Status: SHIPPED | OUTPATIENT
Start: 2024-07-18

## 2024-07-18 RX ORDER — FLASH GLUCOSE SENSOR
1 KIT MISCELLANEOUS
Qty: 2 EACH | Refills: 12 | Status: SHIPPED | OUTPATIENT
Start: 2024-07-18

## 2024-07-18 NOTE — ASSESSMENT & PLAN NOTE
Repeat labs now and in 4 months  Lab Results   Component Value Date    HGBA1C 8.4 (H) 11/20/2023

## 2024-07-18 NOTE — ASSESSMENT & PLAN NOTE
Stable lipid profile repeat lab work now and again repeat in 4 months continue all medications unchanged follow heart healthy diet

## 2024-07-18 NOTE — PROGRESS NOTES
Assessment/Plan:       Problem List Items Addressed This Visit          Cardiovascular and Mediastinum    Essential hypertension     Retention stable good blood pressure today continuing on same medications refill medicines and follow-up in 4 months         Relevant Medications    tadalafil (CIALIS) 10 MG tablet    metFORMIN (GLUCOPHAGE-XR) 500 mg 24 hr tablet    losartan (COZAAR) 50 mg tablet    Empagliflozin (Jardiance) 10 MG TABS tablet       Endocrine    Uncontrolled type 2 diabetes mellitus with hyperglycemia (HCC)     Repeat labs now and in 4 months  Lab Results   Component Value Date    HGBA1C 8.4 (H) 11/20/2023            Relevant Medications    tadalafil (CIALIS) 10 MG tablet    metFORMIN (GLUCOPHAGE-XR) 500 mg 24 hr tablet    losartan (COZAAR) 50 mg tablet    Empagliflozin (Jardiance) 10 MG TABS tablet    Continuous Glucose Sensor (FreeStyle Zuhair 14 Day Sensor) MISC    atorvastatin (LIPITOR) 10 mg tablet    Other Relevant Orders    Albumin / creatinine urine ratio    Comprehensive metabolic panel    Hemoglobin A1C    TSH, 3rd generation with Free T4 reflex    Lipid Panel with Direct LDL reflex       Musculoskeletal and Integument    Tinea cruris    Relevant Medications    metFORMIN (GLUCOPHAGE-XR) 500 mg 24 hr tablet       Other    Hyperglycemia    Relevant Medications    metFORMIN (GLUCOPHAGE-XR) 500 mg 24 hr tablet    Mixed hyperlipidemia     Stable lipid profile repeat lab work now and again repeat in 4 months continue all medications unchanged follow heart healthy diet         Relevant Medications    tadalafil (CIALIS) 10 MG tablet    Empagliflozin (Jardiance) 10 MG TABS tablet    atorvastatin (LIPITOR) 10 mg tablet    Other male erectile dysfunction     Continue with same medication regimen monitor diet and reevaluate at next visit 4 months         Relevant Medications    tadalafil (CIALIS) 10 MG tablet     Other Visit Diagnoses       Screening for colon cancer        Relevant Medications     "tadalafil (CIALIS) 10 MG tablet              Subjective:      Patient ID: Jonathan Hyde is a 64 y.o. male.    HPI    The following portions of the patient's history were reviewed and updated as appropriate: allergies, current medications, past family history, past medical history, past social history, past surgical history and problem list.    Review of Systems   Constitutional:  Negative for chills, fatigue and fever.   HENT:  Negative for congestion, nosebleeds, rhinorrhea, sinus pressure and sore throat.    Eyes:  Negative for discharge and redness.   Respiratory:  Negative for cough and shortness of breath.    Cardiovascular:  Negative for chest pain, palpitations and leg swelling.   Gastrointestinal:  Negative for abdominal pain, blood in stool and nausea.   Endocrine: Negative for cold intolerance, heat intolerance and polyuria.   Genitourinary:  Negative for dysuria and frequency.   Musculoskeletal:  Negative for arthralgias, back pain and myalgias.   Skin:  Negative for rash.   Neurological:  Negative for dizziness, weakness and headaches.   Hematological:  Negative for adenopathy.   Psychiatric/Behavioral:  Negative for behavioral problems and sleep disturbance. The patient is not nervous/anxious.          Objective:      /80 (BP Location: Right arm, Patient Position: Sitting, Cuff Size: Large)   Pulse 77   Temp 97.7 °F (36.5 °C) (Tympanic)   Resp 18   Ht 5' 10\" (1.778 m)   Wt 122 kg (268 lb 9.6 oz)   SpO2 95%   BMI 38.54 kg/m²        Physical Exam  Vitals and nursing note reviewed.   Constitutional:       Appearance: Normal appearance. He is well-developed.   HENT:      Head: Normocephalic and atraumatic.      Right Ear: Tympanic membrane and external ear normal.      Left Ear: Tympanic membrane and external ear normal.      Nose: Nose normal.      Mouth/Throat:      Mouth: Mucous membranes are moist.   Eyes:      General: No scleral icterus.     Conjunctiva/sclera: Conjunctivae normal.      " Pupils: Pupils are equal, round, and reactive to light.   Neck:      Thyroid: No thyromegaly.      Vascular: No JVD.   Cardiovascular:      Rate and Rhythm: Normal rate and regular rhythm.      Heart sounds: Normal heart sounds. No murmur heard.  Pulmonary:      Effort: Pulmonary effort is normal.      Breath sounds: Normal breath sounds. No wheezing or rales.   Chest:      Chest wall: No tenderness.   Abdominal:      General: Bowel sounds are normal. There is no distension.      Palpations: Abdomen is soft. There is no mass.      Tenderness: There is no abdominal tenderness. There is no guarding or rebound.   Musculoskeletal:         General: No tenderness or deformity. Normal range of motion.      Cervical back: Normal range of motion and neck supple.   Lymphadenopathy:      Cervical: No cervical adenopathy.   Skin:     General: Skin is warm and dry.      Findings: No erythema or rash.   Neurological:      General: No focal deficit present.      Mental Status: He is alert and oriented to person, place, and time.      Cranial Nerves: No cranial nerve deficit.      Deep Tendon Reflexes: Reflexes are normal and symmetric. Reflexes normal.   Psychiatric:         Mood and Affect: Mood normal.         Behavior: Behavior normal.         Thought Content: Thought content normal.         Judgment: Judgment normal.          Data:    Laboratory Results: I have personally reviewed the pertinent laboratory results/reports   Radiology/Other Diagnostic Testing Results: I have personally reviewed pertinent reports.       Lab Results   Component Value Date    WBC 7.68 05/06/2023    HGB 16.4 05/06/2023    HCT 48.8 05/06/2023    MCV 91 05/06/2023     05/06/2023     Lab Results   Component Value Date     05/25/2018    K 4.4 11/20/2023     11/20/2023    CO2 29 11/20/2023    ANIONGAP 11.5 05/25/2018    BUN 24 11/20/2023    CREATININE 1.12 11/20/2023    GLUF 194 (H) 11/20/2023    CALCIUM 9.8 11/20/2023    AST 25  "11/20/2023    ALT 35 11/20/2023    ALKPHOS 60 11/20/2023    PROT 7.0 05/25/2018    BILITOT 0.5 05/25/2018    EGFR 70 11/20/2023     Lab Results   Component Value Date    CHOLESTEROL 192 11/20/2023    CHOLESTEROL 235 (H) 05/06/2023    CHOLESTEROL 222 (H) 11/12/2021     Lab Results   Component Value Date    HDL 30 (L) 11/20/2023    HDL 36 (L) 05/06/2023    HDL 41 11/12/2021     Lab Results   Component Value Date    LDLCALC 117 (H) 11/20/2023    LDLCALC 162 (H) 05/06/2023    LDLCALC 164 (H) 11/12/2021     Lab Results   Component Value Date    TRIG 227 (H) 11/20/2023    TRIG 187 (H) 05/06/2023    TRIG 86 11/12/2021     No results found for: \"CHOLHDL\"  Lab Results   Component Value Date    [id number] 2.413 05/06/2023     Lab Results   Component Value Date    HGBA1C 8.4 (H) 11/20/2023     Lab Results   Component Value Date    PSA 0.5 11/20/2023       Kurtis Singleton, DO      "

## 2024-07-18 NOTE — ASSESSMENT & PLAN NOTE
Retention stable good blood pressure today continuing on same medications refill medicines and follow-up in 4 months

## 2024-07-29 DIAGNOSIS — E11.65 UNCONTROLLED TYPE 2 DIABETES MELLITUS WITH HYPERGLYCEMIA (HCC): ICD-10-CM

## 2024-07-29 RX ORDER — FLASH GLUCOSE SENSOR
1 KIT MISCELLANEOUS
Qty: 2 EACH | Refills: 12 | Status: SHIPPED | OUTPATIENT
Start: 2024-07-29

## 2024-08-06 LAB
LEFT EYE DIABETIC RETINOPATHY: NORMAL
RIGHT EYE DIABETIC RETINOPATHY: NORMAL
SEVERITY (EYE EXAM): NORMAL

## 2024-08-14 ENCOUNTER — TELEPHONE (OUTPATIENT)
Age: 64
End: 2024-08-14

## 2024-08-19 NOTE — TELEPHONE ENCOUNTER
PA for FREESTYLE JAIMEE 14 DAY SENSOR DENIED    Reason:(Screenshot if applicable)        Message sent to office clinical pool Yes    Denial letter scanned into Media Yes    Appeal started No (Provider will need to decide if appeal is warranted and send clinical documentation to Prior Authorization Team for initiation.)    **Please follow up with your patient regarding denial and next steps**  
PA for FREESTYLE JAIMEE 14 DAY SENSOR SUBMITTED     via    []CMM-KEY:   [x]Aleksandra-Case ID # [id number]  []Faxed to plan   []Other website   []Phone call Case ID #     Office notes sent, clinical questions answered. Awaiting determination    Turnaround time for your insurance to make a decision on your Prior Authorization can take 7-21 business days.           
Tried to reach out to patient to explain the denial but got no answer  
PAST MEDICAL HISTORY:  ADHD     Arthritis OA    Bipolar disorder pt stopped lithium 1-1.5 mos ago    Insomnia

## 2024-08-27 ENCOUNTER — VBI (OUTPATIENT)
Dept: ADMINISTRATIVE | Facility: OTHER | Age: 64
End: 2024-08-27

## 2024-08-27 NOTE — TELEPHONE ENCOUNTER
Upon review of the In Basket request we were able to locate, review, and update the patient chart as requested for Diabetic Eye Exam.    Any additional questions or concerns should be emailed to the Practice Liaisons via the appropriate education email address, please do not reply via In Basket.    Thank you  Nancy Mullen MA   [facility]

## 2024-10-02 DIAGNOSIS — B35.6 TINEA CRURIS: ICD-10-CM

## 2024-10-02 DIAGNOSIS — I10 ESSENTIAL HYPERTENSION: ICD-10-CM

## 2024-10-02 DIAGNOSIS — E11.65 UNCONTROLLED TYPE 2 DIABETES MELLITUS WITH HYPERGLYCEMIA (HCC): ICD-10-CM

## 2024-10-02 DIAGNOSIS — R73.9 HYPERGLYCEMIA: ICD-10-CM

## 2024-10-02 RX ORDER — METFORMIN HCL 500 MG
500 TABLET, EXTENDED RELEASE 24 HR ORAL 2 TIMES DAILY WITH MEALS
Qty: 60 TABLET | Refills: 2 | Status: SHIPPED | OUTPATIENT
Start: 2024-10-02

## 2024-11-14 ENCOUNTER — TELEPHONE (OUTPATIENT)
Dept: FAMILY MEDICINE CLINIC | Facility: CLINIC | Age: 64
End: 2024-11-14

## 2024-11-16 ENCOUNTER — APPOINTMENT (OUTPATIENT)
Dept: LAB | Facility: HOSPITAL | Age: 64
End: 2024-11-16
Payer: COMMERCIAL

## 2024-11-16 DIAGNOSIS — E78.2 MIXED HYPERLIPIDEMIA: ICD-10-CM

## 2024-11-16 DIAGNOSIS — I10 ESSENTIAL HYPERTENSION: ICD-10-CM

## 2024-11-16 DIAGNOSIS — E11.65 UNCONTROLLED TYPE 2 DIABETES MELLITUS WITH HYPERGLYCEMIA (HCC): ICD-10-CM

## 2024-11-16 DIAGNOSIS — N52.8 OTHER MALE ERECTILE DYSFUNCTION: ICD-10-CM

## 2024-11-16 LAB
ALBUMIN SERPL BCG-MCNC: 4.4 G/DL (ref 3.5–5)
ALP SERPL-CCNC: 48 U/L (ref 34–104)
ALT SERPL W P-5'-P-CCNC: 30 U/L (ref 7–52)
ANION GAP SERPL CALCULATED.3IONS-SCNC: 8 MMOL/L (ref 4–13)
AST SERPL W P-5'-P-CCNC: 18 U/L (ref 13–39)
BILIRUB SERPL-MCNC: 0.49 MG/DL (ref 0.2–1)
BUN SERPL-MCNC: 22 MG/DL (ref 5–25)
CALCIUM SERPL-MCNC: 9.5 MG/DL (ref 8.4–10.2)
CHLORIDE SERPL-SCNC: 101 MMOL/L (ref 96–108)
CHOLEST SERPL-MCNC: 229 MG/DL (ref ?–200)
CO2 SERPL-SCNC: 28 MMOL/L (ref 21–32)
CREAT SERPL-MCNC: 1.09 MG/DL (ref 0.6–1.3)
GFR SERPL CREATININE-BSD FRML MDRD: 72 ML/MIN/1.73SQ M
GLUCOSE P FAST SERPL-MCNC: 131 MG/DL (ref 65–99)
HDLC SERPL-MCNC: 36 MG/DL
LDLC SERPL CALC-MCNC: 155 MG/DL (ref 0–100)
POTASSIUM SERPL-SCNC: 4.3 MMOL/L (ref 3.5–5.3)
PROT SERPL-MCNC: 7.6 G/DL (ref 6.4–8.4)
SODIUM SERPL-SCNC: 137 MMOL/L (ref 135–147)
TRIGL SERPL-MCNC: 190 MG/DL (ref ?–150)
TSH SERPL DL<=0.05 MIU/L-ACNC: 2.13 UIU/ML (ref 0.45–4.5)

## 2024-11-16 PROCEDURE — 83036 HEMOGLOBIN GLYCOSYLATED A1C: CPT

## 2024-11-16 PROCEDURE — 80061 LIPID PANEL: CPT

## 2024-11-16 PROCEDURE — 82570 ASSAY OF URINE CREATININE: CPT

## 2024-11-16 PROCEDURE — 82043 UR ALBUMIN QUANTITATIVE: CPT

## 2024-11-16 PROCEDURE — 84443 ASSAY THYROID STIM HORMONE: CPT

## 2024-11-16 PROCEDURE — 80053 COMPREHEN METABOLIC PANEL: CPT

## 2024-11-16 PROCEDURE — 36415 COLL VENOUS BLD VENIPUNCTURE: CPT

## 2024-11-17 LAB
CREAT UR-MCNC: 120.7 MG/DL
EST. AVERAGE GLUCOSE BLD GHB EST-MCNC: 197 MG/DL
HBA1C MFR BLD: 8.5 %
MICROALBUMIN UR-MCNC: <7 MG/L

## 2024-11-19 ENCOUNTER — OFFICE VISIT (OUTPATIENT)
Dept: FAMILY MEDICINE CLINIC | Facility: CLINIC | Age: 64
End: 2024-11-19
Payer: COMMERCIAL

## 2024-11-19 VITALS
OXYGEN SATURATION: 99 % | WEIGHT: 279.6 LBS | HEART RATE: 76 BPM | BODY MASS INDEX: 40.03 KG/M2 | TEMPERATURE: 99.4 F | RESPIRATION RATE: 18 BRPM | HEIGHT: 70 IN | SYSTOLIC BLOOD PRESSURE: 124 MMHG | DIASTOLIC BLOOD PRESSURE: 88 MMHG

## 2024-11-19 DIAGNOSIS — E11.65 UNCONTROLLED TYPE 2 DIABETES MELLITUS WITH HYPERGLYCEMIA (HCC): ICD-10-CM

## 2024-11-19 DIAGNOSIS — N52.8 OTHER MALE ERECTILE DYSFUNCTION: ICD-10-CM

## 2024-11-19 DIAGNOSIS — E66.01 CLASS 3 SEVERE OBESITY DUE TO EXCESS CALORIES WITH SERIOUS COMORBIDITY AND BODY MASS INDEX (BMI) OF 40.0 TO 44.9 IN ADULT (HCC): ICD-10-CM

## 2024-11-19 DIAGNOSIS — Z00.00 ANNUAL PHYSICAL EXAM: ICD-10-CM

## 2024-11-19 DIAGNOSIS — I10 ESSENTIAL HYPERTENSION: Primary | ICD-10-CM

## 2024-11-19 DIAGNOSIS — E78.2 MIXED HYPERLIPIDEMIA: ICD-10-CM

## 2024-11-19 DIAGNOSIS — E66.813 CLASS 3 SEVERE OBESITY DUE TO EXCESS CALORIES WITH SERIOUS COMORBIDITY AND BODY MASS INDEX (BMI) OF 40.0 TO 44.9 IN ADULT (HCC): ICD-10-CM

## 2024-11-19 DIAGNOSIS — Z12.11 SCREENING FOR COLON CANCER: ICD-10-CM

## 2024-11-19 PROCEDURE — 93000 ELECTROCARDIOGRAM COMPLETE: CPT | Performed by: FAMILY MEDICINE

## 2024-11-19 PROCEDURE — 99396 PREV VISIT EST AGE 40-64: CPT | Performed by: FAMILY MEDICINE

## 2024-11-19 RX ORDER — TADALAFIL 10 MG/1
10 TABLET ORAL DAILY PRN
Qty: 90 TABLET | Refills: 3 | Status: SHIPPED | OUTPATIENT
Start: 2024-11-19

## 2024-11-19 NOTE — PROGRESS NOTES
Adult Annual Physical  Name: Jonathan Hyde      : 1960      MRN: [id number]  Encounter Provider: Kurtis Singleton DO  Encounter Date: 2024   Encounter department: [facility]    Assessment & Plan  Essential hypertension  Hypertension stable control doing well with losartan continue at 50 mg work on weight reduction diet and avoidance of sodium reevaluate at next visit    Orders:    POCT ECG    CT coronary calcium score; Future    Empagliflozin (Jardiance) 25 MG TABS; Take 1 tablet (25 mg total) by mouth daily    tadalafil (CIALIS) 10 MG tablet; Take 1 tablet (10 mg total) by mouth daily as needed for erectile dysfunction    Uncontrolled type 2 diabetes mellitus with hyperglycemia (HCC)  Diabetes with A1c up at 8.5 would like him to work on weight reduction and diet at this time increase Jardiance up to 25 mg and cut metformin in half due to diarrhea and avoidance of concentrated sweets increase physical activity and reevaluate laboratory work in about 4 months  Lab Results   Component Value Date    HGBA1C 8.5 (H) 2024       Orders:    Albumin / creatinine urine ratio; Standing    Comprehensive metabolic panel; Standing    Hemoglobin A1C; Standing    TSH, 3rd generation with Free T4 reflex; Standing    Lipid Panel with Direct LDL reflex; Standing    CT coronary calcium score; Future    Empagliflozin (Jardiance) 25 MG TABS; Take 1 tablet (25 mg total) by mouth daily    tadalafil (CIALIS) 10 MG tablet; Take 1 tablet (10 mg total) by mouth daily as needed for erectile dysfunction    Mixed hyperlipidemia  Mixed hyperlipidemia stable monitor laboratory work discussed alternative medication patient stopped his Lipitor due to joint pains    Orders:    CT coronary calcium score; Future    Empagliflozin (Jardiance) 25 MG TABS; Take 1 tablet (25 mg total) by mouth daily    tadalafil (CIALIS) 10 MG tablet; Take 1 tablet (10 mg total) by mouth daily as needed for erectile  dysfunction    Annual physical exam         Class 3 severe obesity due to excess calories with serious comorbidity and body mass index (BMI) of 40.0 to 44.9 in adult (HCC)    Work on diet and weight loss.         Other male erectile dysfunction    Orders:    tadalafil (CIALIS) 10 MG tablet; Take 1 tablet (10 mg total) by mouth daily as needed for erectile dysfunction    Screening for colon cancer    Orders:    tadalafil (CIALIS) 10 MG tablet; Take 1 tablet (10 mg total) by mouth daily as needed for erectile dysfunction    Immunizations and preventive care screenings were discussed with patient today. Appropriate education was printed on patient's after visit summary.    Discussed risks and benefits of prostate cancer screening. We discussed the controversial history of PSA screening for prostate cancer in the [place] as well as the risk of over detection and over treatment of prostate cancer by way of PSA screening.  The patient understands that PSA blood testing is an imperfect way to screen for prostate cancer and that elevated PSA levels in the blood may also be caused by infection, inflammation, prostatic trauma or manipulation, urological procedures, or by benign prostatic enlargement.    The role of the digital rectal examination in prostate cancer screening was also discussed and I discussed with him that there is large interobserver variability in the findings of digital rectal examination.    Counseling:  Alcohol/drug use: discussed moderation in alcohol intake, the recommendations for healthy alcohol use, and avoidance of illicit drug use.  Dental Health: discussed importance of regular tooth brushing, flossing, and dental visits.  Injury prevention: discussed safety/seat belts, safety helmets, smoke detectors, carbon monoxide detectors, and smoking near bedding or upholstery.  Sexual health: discussed sexually transmitted diseases, partner selection, use of condoms, avoidance of unintended  "pregnancy, and contraceptive alternatives.  Exercise: the importance of regular exercise/physical activity was discussed. Recommend exercise 3-5 times per week for at least 30 minutes.          History of Present Illness     Adult Annual Physical:  Patient presents for annual physical.     Diet and Physical Activity:  - Diet/Nutrition: well balanced diet.  - Exercise: no formal exercise.    Depression Screening:  - PHQ-2 Score: 0    General Health:  - Sleep: sleeps well.  - Hearing: normal hearing bilateral ears.  - Vision: no vision problems.  - Dental: regular dental visits.     Health:    - Urinary symptoms: erectile dysfunction.     Review of Systems   Constitutional:  Negative for chills, fatigue and fever.   HENT:  Negative for congestion, nosebleeds, rhinorrhea, sinus pressure and sore throat.    Eyes:  Negative for discharge and redness.   Respiratory:  Negative for cough and shortness of breath.    Cardiovascular:  Negative for chest pain, palpitations and leg swelling.   Gastrointestinal:  Negative for abdominal pain, blood in stool and nausea.   Endocrine: Negative for cold intolerance, heat intolerance and polyuria.   Genitourinary:  Negative for dysuria and frequency.   Musculoskeletal:  Negative for arthralgias, back pain and myalgias.   Skin:  Negative for rash.   Neurological:  Negative for dizziness, weakness and headaches.   Hematological:  Negative for adenopathy.   Psychiatric/Behavioral:  Negative for behavioral problems and sleep disturbance. The patient is not nervous/anxious.         Poor sleep         Objective   /88 (BP Location: Left arm, Patient Position: Sitting, Cuff Size: Large)   Pulse 76   Temp 99.4 °F (37.4 °C) (Tympanic)   Resp 18   Ht 5' 10\" (1.778 m)   Wt 127 kg (279 lb 9.6 oz)   SpO2 99%   BMI 40.12 kg/m²     Physical Exam  Vitals and nursing note reviewed.   Constitutional:       Appearance: Normal appearance. He is well-developed. He is obese.   HENT:      Head: " Normocephalic and atraumatic.      Right Ear: Tympanic membrane, ear canal and external ear normal.      Left Ear: Tympanic membrane, ear canal and external ear normal.      Nose: Nose normal.      Mouth/Throat:      Mouth: Mucous membranes are moist.   Eyes:      General: No scleral icterus.     Conjunctiva/sclera: Conjunctivae normal.      Pupils: Pupils are equal, round, and reactive to light.   Neck:      Thyroid: No thyromegaly.      Vascular: No JVD.   Cardiovascular:      Rate and Rhythm: Normal rate and regular rhythm.      Pulses: Normal pulses. no weak pulses.           Dorsalis pedis pulses are 2+ on the right side and 2+ on the left side.        Posterior tibial pulses are 2+ on the right side and 2+ on the left side.      Heart sounds: Normal heart sounds. No murmur heard.  Pulmonary:      Effort: Pulmonary effort is normal.      Breath sounds: Normal breath sounds. No wheezing or rales.   Chest:      Chest wall: No tenderness.   Abdominal:      General: Bowel sounds are normal. There is no distension.      Palpations: Abdomen is soft. There is no mass.      Tenderness: There is no abdominal tenderness. There is no guarding or rebound.   Musculoskeletal:         General: No tenderness or deformity. Normal range of motion.      Cervical back: Normal range of motion and neck supple.   Feet:      Right foot:      Skin integrity: No ulcer, skin breakdown, erythema, warmth, callus or dry skin.      Left foot:      Skin integrity: No ulcer, skin breakdown, erythema, warmth, callus or dry skin.   Lymphadenopathy:      Cervical: No cervical adenopathy.   Skin:     General: Skin is warm and dry.      Capillary Refill: Capillary refill takes less than 2 seconds.      Findings: No erythema or rash.   Neurological:      General: No focal deficit present.      Mental Status: He is alert and oriented to person, place, and time. Mental status is at baseline.      Cranial Nerves: No cranial nerve deficit.      Deep  Tendon Reflexes: Reflexes are normal and symmetric. Reflexes normal.   Psychiatric:         Mood and Affect: Mood normal.         Behavior: Behavior normal.         Thought Content: Thought content normal.         Judgment: Judgment normal.       Diabetic Foot Exam    Patient's shoes and socks removed.    Right Foot/Ankle   Right Foot Inspection  Skin Exam: skin normal and skin intact. No dry skin, no warmth, no callus, no erythema, no maceration, no abnormal color, no pre-ulcer, no ulcer and no callus.     Toe Exam: ROM and strength within normal limits.     Sensory   Vibration: intact  Proprioception: intact  Monofilament testing: intact    Vascular  Capillary refills: < 3 seconds  The right DP pulse is 2+. The right PT pulse is 2+.     Left Foot/Ankle  Left Foot Inspection  Skin Exam: skin normal and skin intact. No dry skin, no warmth, no erythema, no maceration, normal color, no pre-ulcer, no ulcer and no callus.     Toe Exam: ROM and strength within normal limits.     Sensory   Vibration: intact  Proprioception: intact  Monofilament testing: intact    Vascular  Capillary refills: < 3 seconds  The left DP pulse is 2+. The left PT pulse is 2+.     Assign Risk Category  No deformity present  No loss of protective sensation  No weak pulses  Risk: 0

## 2024-11-19 NOTE — ASSESSMENT & PLAN NOTE
Hypertension stable control doing well with losartan continue at 50 mg work on weight reduction diet and avoidance of sodium reevaluate at next visit    Orders:    POCT ECG    CT coronary calcium score; Future    Empagliflozin (Jardiance) 25 MG TABS; Take 1 tablet (25 mg total) by mouth daily    tadalafil (CIALIS) 10 MG tablet; Take 1 tablet (10 mg total) by mouth daily as needed for erectile dysfunction

## 2024-11-19 NOTE — ASSESSMENT & PLAN NOTE
Diabetes with A1c up at 8.5 would like him to work on weight reduction and diet at this time increase Jardiance up to 25 mg and cut metformin in half due to diarrhea and avoidance of concentrated sweets increase physical activity and reevaluate laboratory work in about 4 months  Lab Results   Component Value Date    HGBA1C 8.5 (H) 11/16/2024       Orders:    Albumin / creatinine urine ratio; Standing    Comprehensive metabolic panel; Standing    Hemoglobin A1C; Standing    TSH, 3rd generation with Free T4 reflex; Standing    Lipid Panel with Direct LDL reflex; Standing    CT coronary calcium score; Future    Empagliflozin (Jardiance) 25 MG TABS; Take 1 tablet (25 mg total) by mouth daily    tadalafil (CIALIS) 10 MG tablet; Take 1 tablet (10 mg total) by mouth daily as needed for erectile dysfunction

## 2024-11-19 NOTE — ASSESSMENT & PLAN NOTE
Mixed hyperlipidemia stable monitor laboratory work discussed alternative medication patient stopped his Lipitor due to joint pains    Orders:    CT coronary calcium score; Future    Empagliflozin (Jardiance) 25 MG TABS; Take 1 tablet (25 mg total) by mouth daily    tadalafil (CIALIS) 10 MG tablet; Take 1 tablet (10 mg total) by mouth daily as needed for erectile dysfunction

## 2024-11-20 NOTE — ASSESSMENT & PLAN NOTE
Orders:    tadalafil (CIALIS) 10 MG tablet; Take 1 tablet (10 mg total) by mouth daily as needed for erectile dysfunction

## 2024-11-29 ENCOUNTER — HOSPITAL ENCOUNTER (OUTPATIENT)
Dept: CT IMAGING | Facility: HOSPITAL | Age: 64
End: 2024-11-29
Attending: FAMILY MEDICINE
Payer: COMMERCIAL

## 2024-11-29 DIAGNOSIS — I10 ESSENTIAL HYPERTENSION: ICD-10-CM

## 2024-11-29 DIAGNOSIS — E78.2 MIXED HYPERLIPIDEMIA: ICD-10-CM

## 2024-11-29 DIAGNOSIS — E11.65 UNCONTROLLED TYPE 2 DIABETES MELLITUS WITH HYPERGLYCEMIA (HCC): ICD-10-CM

## 2024-11-29 PROCEDURE — 75571 CT HRT W/O DYE W/CA TEST: CPT

## 2024-12-03 ENCOUNTER — RESULTS FOLLOW-UP (OUTPATIENT)
Dept: FAMILY MEDICINE CLINIC | Facility: CLINIC | Age: 64
End: 2024-12-03

## 2024-12-03 DIAGNOSIS — I10 ESSENTIAL HYPERTENSION: ICD-10-CM

## 2024-12-03 DIAGNOSIS — E11.65 UNCONTROLLED TYPE 2 DIABETES MELLITUS WITH HYPERGLYCEMIA (HCC): ICD-10-CM

## 2024-12-03 NOTE — TELEPHONE ENCOUNTER
Left message in regards to following results, any questions to call office.  ----- Message from Kurtis Singleton, DO sent at 12/3/2024  1:57 PM EST -----  Call patient regarding his coronary calcium score it is not too bad overall he can review this and look up the scoring table online.  I can discuss at next office visit no additional new treatment or referral needed at this time

## 2024-12-03 NOTE — TELEPHONE ENCOUNTER
Patient returned call.    Relayed provider's comments and recommendations.  Patient expressed understanding.

## 2024-12-03 NOTE — TELEPHONE ENCOUNTER
Patient request refill of Losartan 50 mg to be filled with [facility] #[id number].    Requested Prescriptions     Pending Prescriptions Disp Refills    losartan (COZAAR) 50 mg tablet 90 tablet 1     Sig: Take 1 tablet (50 mg total) by mouth daily

## 2024-12-04 RX ORDER — LOSARTAN POTASSIUM 50 MG/1
50 TABLET ORAL DAILY
Qty: 90 TABLET | Refills: 1 | Status: SHIPPED | OUTPATIENT
Start: 2024-12-04

## 2025-01-09 ENCOUNTER — TELEPHONE (OUTPATIENT)
Dept: FAMILY MEDICINE CLINIC | Facility: CLINIC | Age: 65
End: 2025-01-09

## 2025-01-09 ENCOUNTER — TELEPHONE (OUTPATIENT)
Age: 65
End: 2025-01-09

## 2025-01-09 DIAGNOSIS — E78.2 MIXED HYPERLIPIDEMIA: ICD-10-CM

## 2025-01-09 DIAGNOSIS — N52.8 OTHER MALE ERECTILE DYSFUNCTION: ICD-10-CM

## 2025-01-09 DIAGNOSIS — Z12.11 SCREENING FOR COLON CANCER: ICD-10-CM

## 2025-01-09 DIAGNOSIS — E11.65 UNCONTROLLED TYPE 2 DIABETES MELLITUS WITH HYPERGLYCEMIA (HCC): ICD-10-CM

## 2025-01-09 DIAGNOSIS — I10 ESSENTIAL HYPERTENSION: ICD-10-CM

## 2025-01-09 RX ORDER — TADALAFIL 10 MG/1
10 TABLET ORAL DAILY PRN
Qty: 30 TABLET | Refills: 3 | Status: SHIPPED | OUTPATIENT
Start: 2025-01-09 | End: 2025-01-10 | Stop reason: SDUPTHER

## 2025-01-09 NOTE — TELEPHONE ENCOUNTER
Patients wife called regarding the [employer] program.  She said when she registered she was not able to download a copy of the script than it needed to be downloaded by the physicians office.    Please advise and notify.    Thank you.

## 2025-01-09 NOTE — TELEPHONE ENCOUNTER
Patient would like a one month supply of the following medication:    tadalafil (CIALIS) 10 MG tablet   Take 1 tablet (10 mg total) by mouth daily as needed for erectile dysfunction     Medication can be sent to pharmacy on file.    Please advise, thank you.

## 2025-01-10 DIAGNOSIS — N52.8 OTHER MALE ERECTILE DYSFUNCTION: ICD-10-CM

## 2025-01-10 DIAGNOSIS — I10 ESSENTIAL HYPERTENSION: ICD-10-CM

## 2025-01-10 DIAGNOSIS — E11.65 UNCONTROLLED TYPE 2 DIABETES MELLITUS WITH HYPERGLYCEMIA (HCC): ICD-10-CM

## 2025-01-10 DIAGNOSIS — Z12.11 SCREENING FOR COLON CANCER: ICD-10-CM

## 2025-01-10 DIAGNOSIS — E78.2 MIXED HYPERLIPIDEMIA: ICD-10-CM

## 2025-01-10 RX ORDER — TADALAFIL 10 MG/1
10 TABLET ORAL DAILY PRN
Qty: 30 TABLET | Refills: 3 | Status: SHIPPED | OUTPATIENT
Start: 2025-01-10 | End: 2025-01-13 | Stop reason: SDUPTHER

## 2025-01-10 NOTE — TELEPHONE ENCOUNTER
I spoke to patient, he stated his wife is at work and will have her call when she can to discuss with script getting over to cost plus program

## 2025-01-13 DIAGNOSIS — Z12.11 SCREENING FOR COLON CANCER: ICD-10-CM

## 2025-01-13 DIAGNOSIS — E11.65 UNCONTROLLED TYPE 2 DIABETES MELLITUS WITH HYPERGLYCEMIA (HCC): ICD-10-CM

## 2025-01-13 DIAGNOSIS — I10 ESSENTIAL HYPERTENSION: ICD-10-CM

## 2025-01-13 DIAGNOSIS — E78.2 MIXED HYPERLIPIDEMIA: ICD-10-CM

## 2025-01-13 DIAGNOSIS — N52.8 OTHER MALE ERECTILE DYSFUNCTION: ICD-10-CM

## 2025-01-13 RX ORDER — TADALAFIL 10 MG/1
10 TABLET ORAL DAILY PRN
Qty: 90 TABLET | Refills: 0 | Status: SHIPPED | OUTPATIENT
Start: 2025-01-13

## 2025-01-13 NOTE — TELEPHONE ENCOUNTER
Patient asking if you can send in a 90 day supply to the [facility] and also requesting and 30 day supply sent [facility] until they get from [facility]

## 2025-01-21 DIAGNOSIS — E11.65 UNCONTROLLED TYPE 2 DIABETES MELLITUS WITH HYPERGLYCEMIA (HCC): Primary | ICD-10-CM

## 2025-01-21 RX ORDER — KETOROLAC TROMETHAMINE 30 MG/ML
1 INJECTION, SOLUTION INTRAMUSCULAR; INTRAVENOUS ONCE
Qty: 1 EACH | Refills: 0 | Status: SHIPPED | OUTPATIENT
Start: 2025-01-21 | End: 2025-01-21

## 2025-01-21 RX ORDER — ACYCLOVIR 800 MG/1
1 TABLET ORAL
Qty: 6 EACH | Refills: 3 | Status: SHIPPED | OUTPATIENT
Start: 2025-01-21

## 2025-01-22 ENCOUNTER — TELEPHONE (OUTPATIENT)
Age: 65
End: 2025-01-22

## 2025-01-22 NOTE — TELEPHONE ENCOUNTER
PA for FreeStyle [employer]uhair 3 [employer])  APPROVED     Date(s) approved January 22, 2025 to January 22, 2026     Case #[id number]     Patient advised by          []Boutique Windowhart Message  []Phone call   []LMOM  [x]L/M to call office as no active Communication consent on file  []Unable to leave detailed message as VM not approved on Communication consent       Pharmacy advised by    [x]Fax  []Phone call    Approval letter scanned into Media Yes

## 2025-01-22 NOTE — TELEPHONE ENCOUNTER
PA for FreeStyle Zuhair [employer] [place] SUBMITTED to     via    []CMM-KEY:   [x]Surescripts-Case ID # [id number]   []Availity-Auth ID # NDC #   []Faxed to plan   []Other website   []Phone call Case ID #     [x]PA sent as URGENT    All office notes, labs and other pertaining documents and studies sent. Clinical questions answered. Awaiting determination from insurance company.     Turnaround time for your insurance to make a decision on your Prior Authorization can take 7-21 business days.

## 2025-01-22 NOTE — TELEPHONE ENCOUNTER
PA for FreeStyle Zuhair 3 [employer] SUBMITTED to     via    []CMM-KEY:   [x]Surescripts-Case ID # [id number]   []Availity-Auth ID # NDC #   []Faxed to plan   []Other website   []Phone call Case ID #     [x]PA sent as URGENT    All office notes, labs and other pertaining documents and studies sent. Clinical questions answered. Awaiting determination from insurance company.     Turnaround time for your insurance to make a decision on your Prior Authorization can take 7-21 business days.

## 2025-01-22 NOTE — TELEPHONE ENCOUNTER
PA for FreeStyle Zuhair 3 [place])  APPROVED     Date(s) approved January 22, 2025 to January 22, 2026     Case #[id number]     Patient advised by          []Panoramic Powerhart Message  []Phone call   []LMOM  [x]L/M to call office as no active Communication consent on file  []Unable to leave detailed message as VM not approved on Communication consent       Pharmacy advised by    [x]Fax  []Phone call    Approval letter scanned into Media Yes

## 2025-01-24 ENCOUNTER — TELEPHONE (OUTPATIENT)
Dept: FAMILY MEDICINE CLINIC | Facility: CLINIC | Age: 65
End: 2025-01-24

## 2025-04-05 ENCOUNTER — APPOINTMENT (OUTPATIENT)
Dept: LAB | Facility: HOSPITAL | Age: 65
End: 2025-04-05
Payer: COMMERCIAL

## 2025-04-05 DIAGNOSIS — E11.65 UNCONTROLLED TYPE 2 DIABETES MELLITUS WITH HYPERGLYCEMIA (HCC): ICD-10-CM

## 2025-04-05 LAB
ALBUMIN SERPL BCG-MCNC: 4.1 G/DL (ref 3.5–5)
ALP SERPL-CCNC: 45 U/L (ref 34–104)
ALT SERPL W P-5'-P-CCNC: 28 U/L (ref 7–52)
ANION GAP SERPL CALCULATED.3IONS-SCNC: 8 MMOL/L (ref 4–13)
AST SERPL W P-5'-P-CCNC: 20 U/L (ref 13–39)
BILIRUB SERPL-MCNC: 0.56 MG/DL (ref 0.2–1)
BUN SERPL-MCNC: 17 MG/DL (ref 5–25)
CALCIUM SERPL-MCNC: 9.2 MG/DL (ref 8.4–10.2)
CHLORIDE SERPL-SCNC: 105 MMOL/L (ref 96–108)
CHOLEST SERPL-MCNC: 147 MG/DL (ref ?–200)
CO2 SERPL-SCNC: 25 MMOL/L (ref 21–32)
CREAT SERPL-MCNC: 1.06 MG/DL (ref 0.6–1.3)
EST. AVERAGE GLUCOSE BLD GHB EST-MCNC: 217 MG/DL
GFR SERPL CREATININE-BSD FRML MDRD: 74 ML/MIN/1.73SQ M
GLUCOSE P FAST SERPL-MCNC: 186 MG/DL (ref 65–99)
HBA1C MFR BLD: 9.2 %
HDLC SERPL-MCNC: 31 MG/DL
LDLC SERPL CALC-MCNC: 81 MG/DL (ref 0–100)
POTASSIUM SERPL-SCNC: 4.4 MMOL/L (ref 3.5–5.3)
PROT SERPL-MCNC: 7.1 G/DL (ref 6.4–8.4)
SODIUM SERPL-SCNC: 138 MMOL/L (ref 135–147)
TRIGL SERPL-MCNC: 176 MG/DL (ref ?–150)
TSH SERPL DL<=0.05 MIU/L-ACNC: 1.33 UIU/ML (ref 0.45–4.5)

## 2025-04-05 PROCEDURE — 80061 LIPID PANEL: CPT

## 2025-04-05 PROCEDURE — 82570 ASSAY OF URINE CREATININE: CPT

## 2025-04-05 PROCEDURE — 82043 UR ALBUMIN QUANTITATIVE: CPT

## 2025-04-05 PROCEDURE — 84443 ASSAY THYROID STIM HORMONE: CPT

## 2025-04-05 PROCEDURE — 36415 COLL VENOUS BLD VENIPUNCTURE: CPT

## 2025-04-05 PROCEDURE — 83036 HEMOGLOBIN GLYCOSYLATED A1C: CPT

## 2025-04-05 PROCEDURE — 80053 COMPREHEN METABOLIC PANEL: CPT

## 2025-04-06 LAB
CREAT UR-MCNC: 83.5 MG/DL
MICROALBUMIN UR-MCNC: <7 MG/L

## 2025-04-08 ENCOUNTER — OFFICE VISIT (OUTPATIENT)
Dept: FAMILY MEDICINE CLINIC | Facility: CLINIC | Age: 65
End: 2025-04-08
Payer: COMMERCIAL

## 2025-04-08 VITALS
RESPIRATION RATE: 18 BRPM | HEART RATE: 94 BPM | WEIGHT: 279 LBS | OXYGEN SATURATION: 98 % | TEMPERATURE: 98.4 F | BODY MASS INDEX: 39.94 KG/M2 | SYSTOLIC BLOOD PRESSURE: 138 MMHG | HEIGHT: 70 IN | DIASTOLIC BLOOD PRESSURE: 82 MMHG

## 2025-04-08 DIAGNOSIS — Z12.11 SCREENING FOR COLON CANCER: ICD-10-CM

## 2025-04-08 DIAGNOSIS — I10 ESSENTIAL HYPERTENSION: Primary | ICD-10-CM

## 2025-04-08 DIAGNOSIS — E78.2 MIXED HYPERLIPIDEMIA: ICD-10-CM

## 2025-04-08 DIAGNOSIS — E11.65 UNCONTROLLED TYPE 2 DIABETES MELLITUS WITH HYPERGLYCEMIA (HCC): ICD-10-CM

## 2025-04-08 DIAGNOSIS — E66.813 CLASS 3 SEVERE OBESITY DUE TO EXCESS CALORIES WITH SERIOUS COMORBIDITY AND BODY MASS INDEX (BMI) OF 40.0 TO 44.9 IN ADULT: ICD-10-CM

## 2025-04-08 PROCEDURE — 99214 OFFICE O/P EST MOD 30 MIN: CPT | Performed by: FAMILY MEDICINE

## 2025-04-08 RX ORDER — TIRZEPATIDE 2.5 MG/.5ML
2.5 INJECTION, SOLUTION SUBCUTANEOUS WEEKLY
Qty: 2 ML | Refills: 0 | Status: SHIPPED | OUTPATIENT
Start: 2025-04-08

## 2025-04-08 RX ORDER — ATORVASTATIN CALCIUM 10 MG/1
10 TABLET, FILM COATED ORAL DAILY
Qty: 90 TABLET | Refills: 3 | Status: SHIPPED | OUTPATIENT
Start: 2025-04-08

## 2025-04-08 NOTE — ASSESSMENT & PLAN NOTE
Hypertension stable control continuing on same losartan dosage and work on weight reduction diet exercise reevaluate 4 months    Orders:    Albumin / creatinine urine ratio; Standing    Comprehensive metabolic panel; Standing    Hemoglobin A1C; Standing    TSH, 3rd generation with Free T4 reflex; Standing    Lipid Panel with Direct LDL reflex; Standing    Tirzepatide (Mounjaro) 2.5 MG/0.5ML SOAJ; Inject 2.5 mg under the skin once a week

## 2025-04-08 NOTE — ASSESSMENT & PLAN NOTE
Karina the benefits of weight loss and diabetes and overall health and he will start a GLP-1 medication more physical activity and reevaluate in 4 months over the summer.    Orders:    Albumin / creatinine urine ratio; Standing    Comprehensive metabolic panel; Standing    Hemoglobin A1C; Standing    TSH, 3rd generation with Free T4 reflex; Standing    Lipid Panel with Direct LDL reflex; Standing    Tirzepatide (Mounjaro) 2.5 MG/0.5ML SOAJ; Inject 2.5 mg under the skin once a week

## 2025-04-08 NOTE — ASSESSMENT & PLAN NOTE
Diabetes under worse control recently patient has been dealing with more stress to his job numbers have gone up through the winter and we hope to get the numbers down now over the next 4 months and reevaluate in August by the summer and I will add GLP-1 medication as discussed with him he is agreeable to this he will continue with the Jardiance and hold off on metformin if this is causing some upset stomach issues.  Lab Results   Component Value Date    HGBA1C 9.2 (H) 04/05/2025       Orders:    atorvastatin (LIPITOR) 10 mg tablet; Take 1 tablet (10 mg total) by mouth daily    Albumin / creatinine urine ratio; Standing    Comprehensive metabolic panel; Standing    Hemoglobin A1C; Standing    TSH, 3rd generation with Free T4 reflex; Standing    Lipid Panel with Direct LDL reflex; Standing    Tirzepatide (Mounjaro) 2.5 MG/0.5ML SOAJ; Inject 2.5 mg under the skin once a week

## 2025-04-08 NOTE — ASSESSMENT & PLAN NOTE
Mixed hyperlipidemia stable doing well overall with atorvastatin continue 10 mg dose reevaluate lab work 4 months    Orders:    Albumin / creatinine urine ratio; Standing    Comprehensive metabolic panel; Standing    Hemoglobin A1C; Standing    TSH, 3rd generation with Free T4 reflex; Standing    Lipid Panel with Direct LDL reflex; Standing    Tirzepatide (Mounjaro) 2.5 MG/0.5ML SOAJ; Inject 2.5 mg under the skin once a week

## 2025-04-08 NOTE — PROGRESS NOTES
Name: Jonathan Hyde      : 1960      MRN: [id number]  Encounter Provider: Kurtis Singleton DO  Encounter Date: 2025   Encounter department: [facility]  :  Assessment & Plan  Uncontrolled type 2 diabetes mellitus with hyperglycemia (HCC)  Diabetes under worse control recently patient has been dealing with more stress to his job numbers have gone up through the winter and we hope to get the numbers down now over the next 4 months and reevaluate in August by the summer and I will add GLP-1 medication as discussed with him he is agreeable to this he will continue with the Jardiance and hold off on metformin if this is causing some upset stomach issues.  Lab Results   Component Value Date    HGBA1C 9.2 (H) 2025       Orders:    atorvastatin (LIPITOR) 10 mg tablet; Take 1 tablet (10 mg total) by mouth daily    Albumin / creatinine urine ratio; Standing    Comprehensive metabolic panel; Standing    Hemoglobin A1C; Standing    TSH, 3rd generation with Free T4 reflex; Standing    Lipid Panel with Direct LDL reflex; Standing    Tirzepatide (Mounjaro) 2.5 MG/0.5ML SOAJ; Inject 2.5 mg under the skin once a week    Essential hypertension  Hypertension stable control continuing on same losartan dosage and work on weight reduction diet exercise reevaluate 4 months    Orders:    Albumin / creatinine urine ratio; Standing    Comprehensive metabolic panel; Standing    Hemoglobin A1C; Standing    TSH, 3rd generation with Free T4 reflex; Standing    Lipid Panel with Direct LDL reflex; Standing    Tirzepatide (Mounjaro) 2.5 MG/0.5ML SOAJ; Inject 2.5 mg under the skin once a week    Class 3 severe obesity due to excess calories with serious comorbidity and body mass index (BMI) of 40.0 to 44.9 in adult (HCC)    Karina the benefits of weight loss and diabetes and overall health and he will start a GLP-1 medication more physical activity and reevaluate in 4 months over the  summer.    Orders:    Albumin / creatinine urine ratio; Standing    Comprehensive metabolic panel; Standing    Hemoglobin A1C; Standing    TSH, 3rd generation with Free T4 reflex; Standing    Lipid Panel with Direct LDL reflex; Standing    Tirzepatide (Mounjaro) 2.5 MG/0.5ML SOAJ; Inject 2.5 mg under the skin once a week    Mixed hyperlipidemia  Mixed hyperlipidemia stable doing well overall with atorvastatin continue 10 mg dose reevaluate lab work 4 months    Orders:    Albumin / creatinine urine ratio; Standing    Comprehensive metabolic panel; Standing    Hemoglobin A1C; Standing    TSH, 3rd generation with Free T4 reflex; Standing    Lipid Panel with Direct LDL reflex; Standing    Tirzepatide (Mounjaro) 2.5 MG/0.5ML SOAJ; Inject 2.5 mg under the skin once a week    Screening for colon cancer    Orders:    Ambulatory Referral to Gastroenterology; Future           History of Present Illness   Follow up evaluation      Review of Systems   Constitutional:  Negative for chills, fatigue and fever.   HENT:  Negative for congestion, nosebleeds, rhinorrhea, sinus pressure and sore throat.    Eyes:  Negative for discharge and redness.   Respiratory:  Negative for cough and shortness of breath.    Cardiovascular:  Negative for chest pain, palpitations and leg swelling.   Gastrointestinal:  Negative for abdominal pain, blood in stool and nausea.   Endocrine: Negative for cold intolerance, heat intolerance and polyuria.   Genitourinary:  Negative for dysuria and frequency.   Musculoskeletal:  Negative for arthralgias, back pain and myalgias.   Skin:  Negative for rash.   Neurological:  Negative for dizziness, weakness and headaches.   Hematological:  Negative for adenopathy.   Psychiatric/Behavioral:  Negative for behavioral problems and sleep disturbance. The patient is not nervous/anxious.        Objective   /82 (BP Location: Left arm, Patient Position: Sitting, Cuff Size: Standard)   Pulse 94   Temp 98.4 °F (36.9  "°C) (Tympanic)   Resp 18   Ht 5' 10\" (1.778 m)   Wt 127 kg (279 lb)   SpO2 98%   BMI 40.03 kg/m²      Physical Exam  Vitals and nursing note reviewed.   Constitutional:       Appearance: Normal appearance. He is well-developed. He is obese.   HENT:      Head: Normocephalic and atraumatic.      Right Ear: External ear normal.      Left Ear: External ear normal.      Nose: Nose normal.   Eyes:      General: No scleral icterus.     Conjunctiva/sclera: Conjunctivae normal.      Pupils: Pupils are equal, round, and reactive to light.   Neck:      Thyroid: No thyromegaly.      Vascular: No JVD.   Cardiovascular:      Rate and Rhythm: Normal rate and regular rhythm.      Heart sounds: Normal heart sounds. No murmur heard.  Pulmonary:      Effort: Pulmonary effort is normal.      Breath sounds: Normal breath sounds. No wheezing or rales.   Chest:      Chest wall: No tenderness.   Abdominal:      General: Bowel sounds are normal. There is no distension.      Palpations: Abdomen is soft. There is no mass.      Tenderness: There is no abdominal tenderness. There is no guarding or rebound.   Musculoskeletal:         General: No tenderness or deformity. Normal range of motion.      Cervical back: Normal range of motion and neck supple.   Lymphadenopathy:      Cervical: No cervical adenopathy.   Skin:     General: Skin is warm and dry.      Findings: No erythema or rash.   Neurological:      Mental Status: He is alert and oriented to person, place, and time.      Cranial Nerves: No cranial nerve deficit.      Deep Tendon Reflexes: Reflexes are normal and symmetric. Reflexes normal.   Psychiatric:         Behavior: Behavior normal.         Thought Content: Thought content normal.         Judgment: Judgment normal.         "

## 2025-04-09 ENCOUNTER — TELEPHONE (OUTPATIENT)
Age: 65
End: 2025-04-09

## 2025-04-09 NOTE — TELEPHONE ENCOUNTER
PA for (Mounjaro) 2.5 MG SUBMITTED to [employer]    via      [x]SS  [x]PA sent as URGENT    All office notes, labs and other pertaining documents and studies sent. Clinical questions answered. Awaiting determination from insurance company.     Turnaround time for your insurance to make a decision on your Prior Authorization can take 7-21 business days.

## 2025-04-10 NOTE — TELEPHONE ENCOUNTER
PA for (Mounjaro) 2.5 MG  APPROVED         Patient advised by          [x]MyChart Message  []Phone call   []LMOM  []L/M to call office as no active Communication consent on file  []Unable to leave detailed message as VM not approved on Communication consent       Pharmacy advised by    [x]Fax  []Phone call  []Secure Chat       Approval letter scanned into Media Yes

## 2025-05-02 DIAGNOSIS — E78.2 MIXED HYPERLIPIDEMIA: ICD-10-CM

## 2025-05-02 DIAGNOSIS — E66.813 CLASS 3 SEVERE OBESITY DUE TO EXCESS CALORIES WITH SERIOUS COMORBIDITY AND BODY MASS INDEX (BMI) OF 40.0 TO 44.9 IN ADULT: ICD-10-CM

## 2025-05-02 DIAGNOSIS — I10 ESSENTIAL HYPERTENSION: ICD-10-CM

## 2025-05-02 DIAGNOSIS — E11.65 UNCONTROLLED TYPE 2 DIABETES MELLITUS WITH HYPERGLYCEMIA (HCC): ICD-10-CM

## 2025-05-02 RX ORDER — TIRZEPATIDE 2.5 MG/.5ML
2.5 INJECTION, SOLUTION SUBCUTANEOUS WEEKLY
Qty: 2 ML | Refills: 0 | Status: SHIPPED | OUTPATIENT
Start: 2025-05-02

## 2025-05-02 NOTE — TELEPHONE ENCOUNTER
Medication: Tirzepatide (Mounjaro)     Dose/Frequency: 2.5 MG/0.5ML    Quantity: 2mL    Pharmacy: [facility]/pharmacy #[id number] - [place] - [address]      Office:   [x] PCP/Provider -   [] Speciality/Provider -     Does the patient have enough for 3 days?   [] Yes   [x] No - Send as HP to POD

## 2025-05-22 DIAGNOSIS — E11.65 UNCONTROLLED TYPE 2 DIABETES MELLITUS WITH HYPERGLYCEMIA (HCC): ICD-10-CM

## 2025-05-22 DIAGNOSIS — I10 ESSENTIAL HYPERTENSION: ICD-10-CM

## 2025-05-23 RX ORDER — LOSARTAN POTASSIUM 50 MG/1
50 TABLET ORAL DAILY
Qty: 90 TABLET | Refills: 1 | Status: SHIPPED | OUTPATIENT
Start: 2025-05-23

## 2025-05-29 DIAGNOSIS — I10 ESSENTIAL HYPERTENSION: ICD-10-CM

## 2025-05-29 DIAGNOSIS — E78.2 MIXED HYPERLIPIDEMIA: ICD-10-CM

## 2025-05-29 DIAGNOSIS — E11.65 UNCONTROLLED TYPE 2 DIABETES MELLITUS WITH HYPERGLYCEMIA (HCC): ICD-10-CM

## 2025-05-30 RX ORDER — EMPAGLIFLOZIN 25 MG/1
25 TABLET, FILM COATED ORAL DAILY
Qty: 90 TABLET | Refills: 1 | Status: SHIPPED | OUTPATIENT
Start: 2025-05-30 | End: 2025-06-04

## 2025-06-03 DIAGNOSIS — E78.2 MIXED HYPERLIPIDEMIA: ICD-10-CM

## 2025-06-03 DIAGNOSIS — E11.65 UNCONTROLLED TYPE 2 DIABETES MELLITUS WITH HYPERGLYCEMIA (HCC): ICD-10-CM

## 2025-06-03 DIAGNOSIS — I10 ESSENTIAL HYPERTENSION: ICD-10-CM

## 2025-06-04 DIAGNOSIS — Z12.11 SCREENING FOR COLON CANCER: ICD-10-CM

## 2025-06-04 DIAGNOSIS — E66.813 CLASS 3 SEVERE OBESITY DUE TO EXCESS CALORIES WITH SERIOUS COMORBIDITY AND BODY MASS INDEX (BMI) OF 40.0 TO 44.9 IN ADULT: ICD-10-CM

## 2025-06-04 DIAGNOSIS — I10 ESSENTIAL HYPERTENSION: ICD-10-CM

## 2025-06-04 DIAGNOSIS — E78.2 MIXED HYPERLIPIDEMIA: ICD-10-CM

## 2025-06-04 DIAGNOSIS — N52.8 OTHER MALE ERECTILE DYSFUNCTION: ICD-10-CM

## 2025-06-04 DIAGNOSIS — E11.65 UNCONTROLLED TYPE 2 DIABETES MELLITUS WITH HYPERGLYCEMIA (HCC): ICD-10-CM

## 2025-06-04 RX ORDER — EMPAGLIFLOZIN 25 MG/1
25 TABLET, FILM COATED ORAL DAILY
Qty: 30 TABLET | Refills: 2 | Status: SHIPPED | OUTPATIENT
Start: 2025-06-04

## 2025-06-04 NOTE — TELEPHONE ENCOUNTER
Reason for call:   [x] Refill   [] Prior Auth  [] Other:     Office:   [x] PCP/Provider - Kurtis Singleton, DO  [] Specialty/Provider -     Medication: tadalafil (CIALIS) 10 MG tablet    Dose/Frequency: Take 1 tablet (10 mg total) by mouth daily as needed for erectile dysfunction    Quantity: 90    Pharmacy: [facility]    Local Pharmacy   Does the patient have enough for 3 days?   [x] Yes   [] No - Send as HP to POD    Mail Away Pharmacy   Does the patient have enough for 10 days?   [] Yes   [] No - Send as HP to POD    Reason for call:   [x] Refill   [] Prior Auth  [] Other:     Office:   [x] PCP/Provider - Kurtis Singleton, DO  [] Specialty/Provider -     Medication: Tirzepatide (Mounjaro) 2.5 MG/0.5ML SOAJ    Dose/Frequency: Inject 2.5 mg under the skin once a week,    Quantity: 2mL    Pharmacy: [facility]/pharmacy #[id number] - [place], [place] - [address], [address]     Local Pharmacy   Does the patient have enough for 3 days?   [x] Yes   [] No - Send as HP to POD    Mail Away Pharmacy   Does the patient have enough for 10 days?   [] Yes   [] No - Send as HP to POD    Reason for call: change pharmacy   [x] Refill   [] Prior Auth  [] Other:     Office:   [x] PCP/Provider - Kurtis Singleton, DO  [] Specialty/Provider -     Medication: losartan (COZAAR) 50 mg tablet    Dose/Frequency: take 1 tablet by mouth once daily,    Quantity: 90    Pharmacy: [id number] [place] [place] [facility] [address], [address]     Local Pharmacy   Does the patient have enough for 3 days?   [x] Yes   [] No - Send as HP to POD    Mail Away Pharmacy   Does the patient have enough for 10 days?   [] Yes   [] No - Send as HP to POD

## 2025-06-05 RX ORDER — LOSARTAN POTASSIUM 50 MG/1
50 TABLET ORAL DAILY
Qty: 90 TABLET | Refills: 1 | Status: SHIPPED | OUTPATIENT
Start: 2025-06-05

## 2025-06-05 RX ORDER — TADALAFIL 10 MG/1
10 TABLET ORAL DAILY PRN
Qty: 90 TABLET | Refills: 1 | Status: SHIPPED | OUTPATIENT
Start: 2025-06-05

## 2025-06-05 RX ORDER — TIRZEPATIDE 2.5 MG/.5ML
2.5 INJECTION, SOLUTION SUBCUTANEOUS WEEKLY
Qty: 2 ML | Refills: 1 | Status: SHIPPED | OUTPATIENT
Start: 2025-06-05

## 2025-06-11 ENCOUNTER — DOCUMENTATION (OUTPATIENT)
Dept: ADMINISTRATIVE | Facility: OTHER | Age: 65
End: 2025-06-11

## 2025-06-11 NOTE — PROGRESS NOTES
06/11/25 9:03 AM     DM A1c outreach is not required, active A1C order    Thank you.  Tristin Stacy MA  [facility]

## 2025-06-17 DIAGNOSIS — E78.2 MIXED HYPERLIPIDEMIA: ICD-10-CM

## 2025-06-17 DIAGNOSIS — I10 ESSENTIAL HYPERTENSION: ICD-10-CM

## 2025-06-17 DIAGNOSIS — E11.65 UNCONTROLLED TYPE 2 DIABETES MELLITUS WITH HYPERGLYCEMIA (HCC): ICD-10-CM

## 2025-06-17 NOTE — TELEPHONE ENCOUNTER
Reason for call:   [x] Refill   [] Prior Auth  [] Other:     Office:   [x] PCP/Provider - Neishaower  [] Specialty/Provider -     Medication: Jardiance 25mg    Dose/Frequency: 1 tab daily     Quantity: 30    Pharmacy: [facility]/pharmacy #[id number] - [place] - [address], [address] [phone]     Local Pharmacy   Does the patient have enough for 3 days?   [] Yes   [x] No - Send as HP to POD

## 2025-07-03 DIAGNOSIS — E11.65 UNCONTROLLED TYPE 2 DIABETES MELLITUS WITH HYPERGLYCEMIA (HCC): ICD-10-CM

## 2025-07-03 DIAGNOSIS — I10 ESSENTIAL HYPERTENSION: ICD-10-CM

## 2025-07-03 DIAGNOSIS — E78.2 MIXED HYPERLIPIDEMIA: ICD-10-CM

## 2025-07-03 DIAGNOSIS — E66.813 CLASS 3 SEVERE OBESITY DUE TO EXCESS CALORIES WITH SERIOUS COMORBIDITY AND BODY MASS INDEX (BMI) OF 40.0 TO 44.9 IN ADULT: ICD-10-CM

## 2025-07-03 RX ORDER — TIRZEPATIDE 2.5 MG/.5ML
2.5 INJECTION, SOLUTION SUBCUTANEOUS WEEKLY
Qty: 2 ML | Refills: 1 | Status: SHIPPED | OUTPATIENT
Start: 2025-07-03 | End: 2025-07-03 | Stop reason: SDUPTHER

## 2025-07-03 RX ORDER — TIRZEPATIDE 2.5 MG/.5ML
2.5 INJECTION, SOLUTION SUBCUTANEOUS WEEKLY
Qty: 2 ML | Refills: 1 | Status: SHIPPED | OUTPATIENT
Start: 2025-07-03

## 2025-07-03 NOTE — TELEPHONE ENCOUNTER
Patient stopped in asking for a refill on this medication. He asked if he could start taking the next dose. Made him aware PCP is out of office.    Would you be willing to send it to the pharmacy for him?

## 2025-07-29 ENCOUNTER — TELEPHONE (OUTPATIENT)
Age: 65
End: 2025-07-29

## 2025-07-29 DIAGNOSIS — E11.65 UNCONTROLLED TYPE 2 DIABETES MELLITUS WITH HYPERGLYCEMIA (HCC): Primary | ICD-10-CM

## 2025-07-29 RX ORDER — TIRZEPATIDE 5 MG/.5ML
5 INJECTION, SOLUTION SUBCUTANEOUS WEEKLY
Qty: 6 ML | Refills: 0 | Status: SHIPPED | OUTPATIENT
Start: 2025-07-29 | End: 2025-08-01 | Stop reason: SDUPTHER

## 2025-08-01 DIAGNOSIS — E11.65 UNCONTROLLED TYPE 2 DIABETES MELLITUS WITH HYPERGLYCEMIA (HCC): ICD-10-CM

## 2025-08-01 RX ORDER — TIRZEPATIDE 5 MG/.5ML
5 INJECTION, SOLUTION SUBCUTANEOUS WEEKLY
Qty: 6 ML | Refills: 0 | OUTPATIENT
Start: 2025-08-01

## 2025-08-01 RX ORDER — TIRZEPATIDE 5 MG/.5ML
5 INJECTION, SOLUTION SUBCUTANEOUS WEEKLY
Qty: 6 ML | Refills: 0 | Status: SHIPPED | OUTPATIENT
Start: 2025-08-01 | End: 2025-08-05

## 2025-08-02 DIAGNOSIS — E11.65 UNCONTROLLED TYPE 2 DIABETES MELLITUS WITH HYPERGLYCEMIA (HCC): ICD-10-CM

## 2025-08-05 RX ORDER — TIRZEPATIDE 5 MG/.5ML
INJECTION, SOLUTION SUBCUTANEOUS
Qty: 6 ML | Refills: 1 | Status: SHIPPED | OUTPATIENT
Start: 2025-08-05 | End: 2025-08-12 | Stop reason: SDUPTHER

## 2025-08-07 ENCOUNTER — TELEPHONE (OUTPATIENT)
Dept: FAMILY MEDICINE CLINIC | Facility: CLINIC | Age: 65
End: 2025-08-07

## 2025-08-09 ENCOUNTER — APPOINTMENT (OUTPATIENT)
Dept: LAB | Facility: HOSPITAL | Age: 65
End: 2025-08-09
Payer: COMMERCIAL

## 2025-08-12 ENCOUNTER — OFFICE VISIT (OUTPATIENT)
Dept: FAMILY MEDICINE CLINIC | Facility: CLINIC | Age: 65
End: 2025-08-12
Payer: COMMERCIAL